# Patient Record
Sex: FEMALE | Race: WHITE | NOT HISPANIC OR LATINO | Employment: FULL TIME | URBAN - METROPOLITAN AREA
[De-identification: names, ages, dates, MRNs, and addresses within clinical notes are randomized per-mention and may not be internally consistent; named-entity substitution may affect disease eponyms.]

---

## 2023-08-07 ENCOUNTER — TELEPHONE (OUTPATIENT)
Dept: PRIMARY CARE | Facility: CLINIC | Age: 48
End: 2023-08-07
Payer: COMMERCIAL

## 2023-08-07 DIAGNOSIS — N92.1 MENORRHAGIA WITH IRREGULAR CYCLE: Primary | ICD-10-CM

## 2023-08-07 NOTE — TELEPHONE ENCOUNTER
Patient say she has been on her cycle for 2 plus weeks  she want to know what to do , she say this is not normal for her

## 2024-02-28 PROBLEM — L02.91 ABSCESS: Status: ACTIVE | Noted: 2024-02-28

## 2024-02-28 PROBLEM — R87.619 ABNORMAL PAP SMEAR OF CERVIX: Status: ACTIVE | Noted: 2024-02-28

## 2024-02-28 PROBLEM — N75.1 BARTHOLIN'S GLAND ABSCESS: Status: ACTIVE | Noted: 2024-02-28

## 2024-02-28 PROBLEM — E55.9 VITAMIN D DEFICIENCY: Status: ACTIVE | Noted: 2024-02-28

## 2024-02-28 PROBLEM — R00.2 FLUTTERING SENSATION OF HEART: Status: ACTIVE | Noted: 2024-02-28

## 2024-02-28 PROBLEM — R11.2 POST-OPERATIVE NAUSEA AND VOMITING: Status: ACTIVE | Noted: 2024-02-28

## 2024-02-28 PROBLEM — K91.2 POSTOPERATIVE MALABSORPTION (HHS-HCC): Status: ACTIVE | Noted: 2024-02-28

## 2024-02-28 PROBLEM — R05.9 COUGH: Status: ACTIVE | Noted: 2024-02-28

## 2024-02-28 PROBLEM — E66.9 CLASS 1 OBESITY: Status: ACTIVE | Noted: 2024-02-28

## 2024-02-28 PROBLEM — Z98.890 POST-OPERATIVE NAUSEA AND VOMITING: Status: ACTIVE | Noted: 2024-02-28

## 2024-02-28 PROBLEM — D48.5 NEOPLASM OF UNCERTAIN BEHAVIOR OF SKIN: Status: ACTIVE | Noted: 2023-07-18

## 2024-02-28 PROBLEM — R93.1 ELEVATED CORONARY ARTERY CALCIUM SCORE: Status: ACTIVE | Noted: 2024-02-28

## 2024-02-28 PROBLEM — L02.214 CUTANEOUS ABSCESS OF GROIN: Status: ACTIVE | Noted: 2023-07-18

## 2024-02-28 PROBLEM — R79.0 ABNORMAL IRON SATURATION: Status: ACTIVE | Noted: 2024-02-28

## 2024-02-28 PROBLEM — N60.02 BREAST CYST, LEFT: Status: ACTIVE | Noted: 2024-02-28

## 2024-02-28 PROBLEM — M25.50 JOINT PAIN: Status: ACTIVE | Noted: 2024-02-28

## 2024-02-28 PROBLEM — L73.2 HIDRADENITIS SUPPURATIVA: Status: ACTIVE | Noted: 2023-07-18

## 2024-02-28 PROBLEM — R60.9 EDEMA: Status: ACTIVE | Noted: 2024-02-28

## 2024-02-28 PROBLEM — D22.62 MELANOCYTIC NEVI OF LEFT UPPER LIMB, INCLUDING SHOULDER: Status: ACTIVE | Noted: 2023-07-18

## 2024-02-28 PROBLEM — R42 DIZZY: Status: ACTIVE | Noted: 2024-02-28

## 2024-02-28 PROBLEM — Z98.84 S/P GASTRIC BYPASS: Status: ACTIVE | Noted: 2024-02-28

## 2024-02-28 PROBLEM — R29.818 SUSPECTED SLEEP APNEA: Status: ACTIVE | Noted: 2024-02-28

## 2024-02-28 PROBLEM — R92.30 DENSE BREAST TISSUE: Status: ACTIVE | Noted: 2024-02-28

## 2024-02-28 PROBLEM — L81.4 OTHER MELANIN HYPERPIGMENTATION: Status: ACTIVE | Noted: 2023-07-18

## 2024-02-28 PROBLEM — R39.14 FEELING OF INCOMPLETE BLADDER EMPTYING: Status: ACTIVE | Noted: 2024-02-28

## 2024-02-28 PROBLEM — R53.83 LACK OF ENERGY: Status: ACTIVE | Noted: 2024-02-28

## 2024-02-28 PROBLEM — K21.9 GERD (GASTROESOPHAGEAL REFLUX DISEASE): Status: ACTIVE | Noted: 2024-02-28

## 2024-02-28 PROBLEM — R63.5 WEIGHT GAIN: Status: ACTIVE | Noted: 2024-02-28

## 2024-02-28 PROBLEM — Z98.84 BARIATRIC SURGERY STATUS: Status: ACTIVE | Noted: 2024-02-28

## 2024-02-28 PROBLEM — R68.89 FORGETFULNESS: Status: ACTIVE | Noted: 2024-02-28

## 2024-02-28 PROBLEM — N89.8 VAGINAL DISCHARGE: Status: ACTIVE | Noted: 2024-02-28

## 2024-02-28 PROBLEM — N63.10 BREAST MASS, RIGHT: Status: ACTIVE | Noted: 2024-02-28

## 2024-02-28 PROBLEM — D23.61 OTHER BENIGN NEOPLASM OF SKIN OF RIGHT UPPER LIMB, INCLUDING SHOULDER: Status: ACTIVE | Noted: 2023-07-18

## 2024-02-28 PROBLEM — F32.A DEPRESSION: Status: ACTIVE | Noted: 2024-02-28

## 2024-02-28 PROBLEM — C50.919 BREAST CANCER (MULTI): Status: ACTIVE | Noted: 2024-02-28

## 2024-02-28 PROBLEM — D22.70 MELANOCYTIC NEVI OF UNSPECIFIED LOWER LIMB, INCLUDING HIP: Status: ACTIVE | Noted: 2023-07-18

## 2024-02-28 PROBLEM — M67.02 ACQUIRED SHORT ACHILLES TENDON OF LEFT LOWER EXTREMITY: Status: ACTIVE | Noted: 2024-02-28

## 2024-02-28 PROBLEM — G89.18 POST-OP PAIN: Status: ACTIVE | Noted: 2024-02-28

## 2024-02-28 PROBLEM — I83.11 VARICOSE VEINS OF RIGHT LOWER EXTREMITY WITH INFLAMMATION: Status: ACTIVE | Noted: 2023-07-18

## 2024-02-28 PROBLEM — D18.01 HEMANGIOMA OF SKIN AND SUBCUTANEOUS TISSUE: Status: ACTIVE | Noted: 2023-07-18

## 2024-02-28 PROBLEM — E66.811 CLASS 1 OBESITY: Status: ACTIVE | Noted: 2024-02-28

## 2024-02-28 PROBLEM — J30.2 SEASONAL ALLERGIES: Status: ACTIVE | Noted: 2024-02-28

## 2024-02-28 PROBLEM — C50.311: Status: ACTIVE | Noted: 2024-02-28

## 2024-02-28 PROBLEM — M72.2 PLANTAR FASCIITIS, LEFT: Status: ACTIVE | Noted: 2024-02-28

## 2024-02-28 PROBLEM — E61.1 IRON DEFICIENCY: Status: ACTIVE | Noted: 2024-02-28

## 2024-02-28 PROBLEM — G47.33 OBSTRUCTIVE SLEEP APNEA SYNDROME: Status: ACTIVE | Noted: 2024-02-28

## 2024-02-28 PROBLEM — D49.2 NEOPLASM OF UNSPECIFIED BEHAVIOR OF BONE, SOFT TISSUE, AND SKIN: Status: ACTIVE | Noted: 2023-07-18

## 2024-02-28 RX ORDER — CITALOPRAM 20 MG/1
1 TABLET, FILM COATED ORAL DAILY
COMMUNITY
Start: 2020-06-12

## 2024-02-28 RX ORDER — CLINDAMYCIN PHOSPHATE 10 UG/ML
LOTION TOPICAL
COMMUNITY
Start: 2023-05-10

## 2024-02-28 RX ORDER — BENZOYL PEROXIDE 50 MG/ML
LIQUID TOPICAL
COMMUNITY
Start: 2023-07-19

## 2024-02-28 RX ORDER — PANTOPRAZOLE SODIUM 40 MG/1
1 TABLET, DELAYED RELEASE ORAL DAILY
COMMUNITY
Start: 2022-12-12 | End: 2024-04-24 | Stop reason: ALTCHOICE

## 2024-02-28 RX ORDER — GUAIFENESIN 600 MG/1
600 TABLET, EXTENDED RELEASE ORAL 2 TIMES DAILY
COMMUNITY
Start: 2018-11-24 | End: 2024-04-24 | Stop reason: ALTCHOICE

## 2024-02-28 RX ORDER — ATORVASTATIN CALCIUM 10 MG/1
1 TABLET, FILM COATED ORAL DAILY
COMMUNITY
Start: 2020-12-11 | End: 2024-04-24 | Stop reason: ALTCHOICE

## 2024-02-28 RX ORDER — ASTRAGALUS ROOT 470 MG
CAPSULE ORAL WEEKLY
COMMUNITY
Start: 2021-12-20 | End: 2024-04-24 | Stop reason: ALTCHOICE

## 2024-02-28 RX ORDER — ASPIRIN 325 MG
TABLET ORAL EVERY 12 HOURS
COMMUNITY
Start: 2021-12-20

## 2024-02-28 RX ORDER — BENZONATATE 100 MG/1
100 CAPSULE ORAL EVERY 8 HOURS PRN
COMMUNITY
Start: 2018-11-24 | End: 2024-04-24 | Stop reason: ALTCHOICE

## 2024-02-28 RX ORDER — SODIUM PICOSULFATE, MAGNESIUM OXIDE, AND ANHYDROUS CITRIC ACID 10; 3.5; 12 MG/160ML; G/160ML; G/160ML
LIQUID ORAL
COMMUNITY
Start: 2021-11-12 | End: 2024-04-24 | Stop reason: ALTCHOICE

## 2024-04-23 PROBLEM — Z85.3 HISTORY OF RIGHT BREAST CANCER: Status: ACTIVE | Noted: 2024-04-23

## 2024-04-23 PROBLEM — Z00.00 NORMAL BREAST EXAM: Status: ACTIVE | Noted: 2024-02-28

## 2024-04-23 NOTE — PROGRESS NOTES
"Oncology Follow-Up    Nydia Braun  [id number]              Breast         AJCC Edition: 7th (AJCC), Diagnosis Date: 12-Sep-2016, IIA, T2 N0 M0     Oncology History    No history exists.     Treatment History:    Pt reports feeling a \"bump\" in her right breast about 6 months prior to breast imaging.  Was considering weight loss surgery and given breast symptoms, imaging was  ordered     8/29/16- Diagnostic b/l mammogram with gertrudis and b/l breast ultrasound- On mammogram, right breast circumscribed mass in inferior medial breast and mass in superior right breast (may represent intramammary LN). Asymmetry in left breast.  U/s right breast-  4:00, 9cm FN, 3.6 X 3.3 X 3.5 cm irregular hypoechoic mass; in superior breast, at 9:00, 8 cm FN, 2 intramammary LNs, larger one measuring 0.6 cm.  U/s left breast 0.8 cm complicated cyst at 12:00, 7 cm FN.  Tissue markers placed at time of biopsy     9/12/16- U/s guided core biopsy of right breast mass, 4:00, 9cm FN- IDC, grade 3 with extensive necrosis, ER-neg, HI-neg, HER2 2+ by IHC (dual NACHO neg with a HER2/CEP17 ratio of 1.2).  Right breast intramammary LN biopsy- negative for cancer.     9/29/16 - Mediport placed     12/14/16 - negative genetic testing     10/5/16 - 2/15/17 neoadjuvant ddAC every 2 weeks X 4---> weekly taxol X 12 completed     2/27/17- Right lumpectomy with SN biopsy showed no residual cancer; 0/2 LNs involved, no LVI.  Path staging: pCR, ypT0N0, RCB 0     Post-lumpectomy right breast radiation completed 5/2017       Subjective    Nydia presents for her Oncology Follow Up Visit. She feels well and reports no new health issues. She has gained 20# back of her weight loss since bariatric surgery. Nydia rates her energy level as 9/10 and reports no distress. Her family is doing well with living with her parents though there are some hurdles. Nydia denies any unusual headaches, balance issues, depression, cough, shortness of breath, problems swallowing, " changes in chest/breast area, abdominal pain, bone or muscle pain, vaginal bleeding, rectal bleeding, blood in the urine, vaginal dryness, swelling arms or legs, new or unusual skin moles or lesions.         Objective      Vitals:    04/24/24 0833   BP: 110/72   Pulse: 54   Temp: 36.1 °C (97 °F)        Constitutional: Well developed, alert/oriented x3, no distress, cooperative   Eyes: clear sclera   ENMT: mucous membranes moist, no apparent lesions   Head/Neck: Neck supple, no bruits   Respiratory/Thorax: Patent airways, normal breath sounds with good chest expansion   Cardiovascular: Regular rate and rhythm, no murmurs, 2+ equal pulses of the extremities,   Gastrointestinal: Nondistended, soft, non-tender, no masses palpable, no organomegaly   Musculoskeletal: ROM intact, no joint swelling, normal strength   Extremities: normal extremities, no edema, cyanosis, contusions or wounds   Neurological: alert and oriented x3,  normal strength   Breast:   Lymphatic: No significant lymphadenopathy   Psychological: Appropriate mood and behavior   Skin: Warm and dry, no lesions, no rashes      Physical Exam  Chest:          Comments: Right breast + for breast conserving surgery with well healed lower inner and right axillary incisions; no masses, nodules, skin changes, discharge. Left breast without masses, nodules, skin changes, discharge.          Lab Results   Component Value Date    WBC 5.3 11/25/2022    HGB 10.6 (L) 11/25/2022    HCT 32.4 (L) 11/25/2022    MCV 96 11/25/2022     11/25/2022       Chemistry    Lab Results   Component Value Date/Time     11/25/2022 1445    K 4.2 11/25/2022 1445     11/25/2022 1445    CO2 29 11/25/2022 1445    BUN 10 11/25/2022 1445    CREATININE 0.58 11/25/2022 1445    Lab Results   Component Value Date/Time    CALCIUM 8.9 11/25/2022 1445    ALKPHOS 121 (H) 11/25/2022 1445    AST 19 11/25/2022 1445    ALT 19 11/25/2022 1445    BILITOT 0.3 11/25/2022 1445            Imaging:  Status Exam Begun Exam Ended   Final 4/24/2024 07:40 4/24/2024 08:02     Study Result    Narrative & Impression   Interpreted By:  Jaylene Sena,   STUDY:  BI MAMMO BILATERAL SCREENING TOMOSYNTHESIS;  4/24/2024 8:02 am      ACCESSION NUMBER(S):  [id number]      ORDERING CLINICIAN:  PURA PAULA      INDICATION:  Screening. Right lumpectomy with radiation treatment and chemotherapy.      COMPARISON:  04/07/2023, 02/11/2022, 02/09/2021, 01/29/2019, 08/29/2016      FINDINGS:  2D and tomosynthesis images were reviewed at 1 mm slice thickness.      Density:  The breast tissue is heterogeneously dense, which may  obscure small masses.      Stable postsurgical scarring with surgical clips and dystrophic  calcification in the deep central aspect of the right breast. Stable  postsurgical scarring overlying the right axilla. Stable mild  trabecular thickening and skin thickening of the right breast  consistent with radiation treatment. No suspicious masses or  calcifications are identified. The glandular pattern within the left  breast is stable.      IMPRESSION:  No mammographic evidence of malignancy.      BI-RADS CATEGORY:      BI-RADS Category:  2 Benign.  Recommendation:  Annual Screening.  Recommended Date:  1 Year.  Laterality:  Bilateral.     Assessment/Plan    Nydia is a 50 yo woman with a hx of T2N0 right IDC G2 diagnosed September 2016. She is s/p neoadjuvant AC-T with pCR, RBC=0, XRT. She has been on observation status; There is no evidence of recurrent disease on today's exam.   Plan:  Exam and mammogram negative.  Encouraged shingles vaccine and Covid booster.  Discussed importance of losing some of the weight she gained as it was 10# at our last visit and now it is 20#  Encouraged monthly breast self exams, plant based diet, keep alcohol <3 drinks/week, exercise at least 2.5 hours/week.  We reviewed signs/symptoms of recurrence including new masses, new pigmented lesion, tugging or pulling of the  skin, nipple discharge, rash in or around the chest area, or any new finding that doesn't resolve within a 2-3 weeks.  All of Nydia's questions/concerns were addressed.   Over 25 minutes of time was spent with this patient with >50% of the time with education, counseling, and coordination of care.  I will see her back with her mammogram in one year. She will call with any concerns.    Diagnoses and all orders for this visit:  History of right breast cancer  -     Clinic Appointment Request Follow Up; PURA PAULA; Future  -     BI mammo bilateral screening tomosynthesis; Future  Normal breast exam  History of bariatric surgery  History of antineoplastic chemotherapy        Pura Paual, APRN-CNP

## 2024-04-24 ENCOUNTER — HOSPITAL ENCOUNTER (OUTPATIENT)
Dept: RADIOLOGY | Facility: CLINIC | Age: 49
Discharge: HOME | End: 2024-04-24
Payer: COMMERCIAL

## 2024-04-24 ENCOUNTER — APPOINTMENT (OUTPATIENT)
Dept: RADIOLOGY | Facility: CLINIC | Age: 49
End: 2024-04-24

## 2024-04-24 ENCOUNTER — OFFICE VISIT (OUTPATIENT)
Dept: HEMATOLOGY/ONCOLOGY | Facility: CLINIC | Age: 49
End: 2024-04-24
Payer: COMMERCIAL

## 2024-04-24 ENCOUNTER — APPOINTMENT (OUTPATIENT)
Dept: HEMATOLOGY/ONCOLOGY | Facility: CLINIC | Age: 49
End: 2024-04-24

## 2024-04-24 VITALS — BODY MASS INDEX: 29.99 KG/M2 | HEIGHT: 65 IN | WEIGHT: 180 LBS

## 2024-04-24 VITALS
BODY MASS INDEX: 30.74 KG/M2 | WEIGHT: 184.75 LBS | TEMPERATURE: 97 F | SYSTOLIC BLOOD PRESSURE: 110 MMHG | HEART RATE: 54 BPM | DIASTOLIC BLOOD PRESSURE: 72 MMHG

## 2024-04-24 DIAGNOSIS — Z85.3 HISTORY OF RIGHT BREAST CANCER: Primary | ICD-10-CM

## 2024-04-24 DIAGNOSIS — Z98.84 HISTORY OF BARIATRIC SURGERY: ICD-10-CM

## 2024-04-24 DIAGNOSIS — Z00.00 NORMAL BREAST EXAM: ICD-10-CM

## 2024-04-24 DIAGNOSIS — Z12.31 ENCOUNTER FOR SCREENING MAMMOGRAM FOR MALIGNANT NEOPLASM OF BREAST: ICD-10-CM

## 2024-04-24 DIAGNOSIS — Z92.21 HISTORY OF ANTINEOPLASTIC CHEMOTHERAPY: ICD-10-CM

## 2024-04-24 PROCEDURE — 99213 OFFICE O/P EST LOW 20 MIN: CPT | Performed by: NURSE PRACTITIONER

## 2024-04-24 PROCEDURE — 77063 BREAST TOMOSYNTHESIS BI: CPT | Mod: BILATERAL PROCEDURE | Performed by: RADIOLOGY

## 2024-04-24 PROCEDURE — 77067 SCR MAMMO BI INCL CAD: CPT

## 2024-04-24 PROCEDURE — 77067 SCR MAMMO BI INCL CAD: CPT | Mod: BILATERAL PROCEDURE | Performed by: RADIOLOGY

## 2024-04-24 ASSESSMENT — PAIN SCALES - GENERAL: PAINLEVEL: 0-NO PAIN

## 2024-04-24 NOTE — PATIENT INSTRUCTIONS
1. Exercise 2.5 hours per week; bone strengthening, cardio-vascular, resistance training.  2. Please do self breast exams monthly.  3. Keep alcohol under 3 drinks per week.  4. Sun safety - limit sun exposure from 11a-2p when its at its hottest, apply 15-30 sun block and re-apply every 1-2 hours if perspiring or swimming.  5. Eat a plant based diet, add in oily fishes such as mackerel, tuna, and salmon.  6. Get in at least 1,000 mg of calcium per day through diet or supplement for bone strength. Examples of foods higher in calcium are milk, yogurt, fruited yogurt, oranges, fortified orange juice, almonds, almond milk, broccoli, spinach, bok selvin, mustard greens, puddings, custards, ice cream, fortified cereals, bars, and crackers.   7. Your exam and mammogram are both negative today.  8. Please call the office if any new mass or rash in or around breast, or any uncontrolled symptoms that last over 2-3 weeks at [phone].  9. It was nice seeing you today, Roxy. I will see you back in one year. Please call with any concerns.   Have a nice spring and summer!  Have a Happy, Healthy, Holiday Season!

## 2024-05-28 NOTE — PROGRESS NOTES
Surgery Date: 5.26.21  Surgeon: Edilia  Procedure: Gastric Bypass    ASSESSMENT   Current weight pounds:     --             Ht:   65.0  in.   BMI:  --  Previous weight pounds:   172.0    6/15/23  Initial start weight: 257 lbs (5.20.21)  EBW: 107 lbs  Total weight change pounds :  --  %EBW Lost: --    PROGRESS:  Nutrition Interventions for last encounter (date):   1. Eat at least 80 g protein per day. Eat 80-90 g of protein per day. Aim for 20 g protein per meal. have 2 high protein snacks that are 10-15 g protein each. You can try cottage cheese, Greek yogurt, tuna or chicken packets. you can try protein bars like Pure Protein, Quest, Premier, and Built Bars. You can also try Quest protein chips.   2. Continue to drink 64 oz. of zero calorie beverages per day  3. Continue no drinking 30 min before, during the meal and for 30 minutes after the meal  4. Do exercise of choice for 30 min 3x/week. Increase intensity and duration of exercise  5. Take a total of 5452-5060 mg calcium citrate daily in divided doses of 500-600 mg, 2 hours apart from each other and from your Flintstones.   6. Remove all alcohol from your home. Make an apt with a therapist. Try to schedule with Dr. Kamini Sultana    CHANGES IN TREATMENT:   Patient met goals:     Partially   24 hour food recall:   11 am Breakfast:   Drinkable  Greek yogurt  20 g pro and banana  Snack:   none  Lunch:  none  Snack:   none  4 p Dinner:  tried to but had stomachache   Snack:  bone broth and toast  Snack: handful popcorn  Beverages:    64 oz water or  flavored water  Alcohol: 1x/week 2 glasses of wine;   was drinking  more often and has been working to reduce it over the past year     Vitamins:     2 Flintstones, Ocuvite, Mg PRN, and no calcium  Nausea/Vomiting/Diarrhea/Constipation:  +stomach pain and will start taking her PPI again   Physical Activity: no planned exercise     READINESS TO LEARN:  Motivation to learn:           Interested      Understanding of  instruction: Good       Anticipated Compliance: Good       Family Support: Unable to assess-family not present     Patient presents with post-op weight loss surgery gastric bypass. Pt reports her lowest weight was 160  pounds and her weight today is 184 pounds.  She  state that she has never done exercise well and would like to focus on getting more exercise in this summer and or increasing physical activity.  She signed for Pilates today to try it out.  She is going to do it 2x/week.  She is planning to ride her bike when staying her acamper.  She has a puppy and would like to walk it more.  She has a pool at home. She also has [employer] membership. She is off work for the summer.  She would like to set a goal to exercise on 3 days out of the week.    She sold her home and is now living with her parents to help take care of them.  She is their caregiver.    Pt reports some stomach sensitivity lately. She did not eat enough protein yesterday.  She is going to start taking omeprazole again.    She doesn't like chicken anymore.  It causes pain. She eats more servings of low fat dairy; cottage cheese, Greek yogurt.  She eats more snacks; Tyra kisses and popcorn   She states that she can eat candy without having dumping.    Advised to work on increasing protein intake to meet a goal of 80 g daily.  Discussed ways to do that.   Also advised to start taking calcium citrate again.  Recommend staying away from candy and popcorn.  Pt reports an issue with alcohol and has been doing well over the past year minimizing it to 2 glasses of wine per week.       Malnutrition Screening  Significant unintentional weight loss? n/a  Eating less than 75% of usual intake for more than 2 weeks? n/a     Nutrition Diagnosis:   1. Increased protein and nutrition needs related to altered GI function as evidenced by pt. s/p gastric bypass.  2. Food- and nutrition-related knowledge deficit related to lack of prior exposure to surgical weight loss  information as evidenced by diet recall.     Nutrition Interventions:   1. Modify type and amount of food and nutrients within meals and snacks.  2. Comprehensive Nutrition Education  -Nutrition education materials: SG schedule        Recommendations:    1. Eat 80 g of protein per day. . Aim for 2 oz or 14 g protein per meal and have 2 high protein snacks that are  10-20 g protein each.  You can try a tuna or chicken packet, Greek yogurt, 2 string cheeses, Protein bars like Quest, Pure Protein, Premier, or Built Bars. you can also try protein chips form Quest or Atkins.  Start tracking  your intake again to make sure you are meeting your protein goal.   2. Continue to drink 64 oz. of zero calorie beverages per day  3. Continue no drinking 30 min before, during the meal and for 30 minutes after the meal  4. Start doing exercise or  physical activity of choice for at  least 30 min 3x/week.   5. Continue current vit/min regimen. Add you calcium  citrate back in.  Take 1 chew  3x/day.   6.         Attend monthly support groups    Nutrition Monitoring and Evaluation:   1-2 pounds weight loss per week  Criteria: weight check, food recall  Need for Follow-up:   1 month       Diana RODRIGUES, [facility]  Bariatric Surgery Dietitian  Phone: [phone]  Fax: [phone]

## 2024-06-03 ENCOUNTER — APPOINTMENT (OUTPATIENT)
Dept: SURGERY | Facility: CLINIC | Age: 49
End: 2024-06-03
Payer: COMMERCIAL

## 2024-06-03 ENCOUNTER — TELEMEDICINE CLINICAL SUPPORT (OUTPATIENT)
Dept: SURGERY | Facility: CLINIC | Age: 49
End: 2024-06-03
Payer: COMMERCIAL

## 2024-06-12 ENCOUNTER — TELEPHONE (OUTPATIENT)
Dept: GYNECOLOGIC ONCOLOGY | Facility: HOSPITAL | Age: 49
End: 2024-06-12
Payer: COMMERCIAL

## 2024-06-12 DIAGNOSIS — Z85.3 PERSONAL HISTORY OF BREAST CANCER: ICD-10-CM

## 2024-06-12 DIAGNOSIS — F32.89 OTHER DEPRESSION: Primary | ICD-10-CM

## 2024-06-12 RX ORDER — CITALOPRAM 20 MG/1
20 TABLET, FILM COATED ORAL DAILY
Qty: 90 TABLET | Refills: 2 | Status: SHIPPED | OUTPATIENT
Start: 2024-06-12

## 2024-07-02 ENCOUNTER — NUTRITION (OUTPATIENT)
Dept: SURGERY | Facility: CLINIC | Age: 49
End: 2024-07-02
Payer: COMMERCIAL

## 2024-07-02 VITALS — BODY MASS INDEX: 30.29 KG/M2 | WEIGHT: 181.8 LBS | HEIGHT: 65 IN

## 2024-07-02 NOTE — PROGRESS NOTES
Surgery Date: 5.26.21  Surgeon: Edilia  Procedure: Gastric Bypass    ASSESSMENT:  Current weight pounds:    181.8             Ht:   65.0  in.   BMI:  30.25  Previous weight pounds:   --  Initial start weight: 257 lbs (5.20.21)  EBW: 107 lbs  Total weight change pounds :   75.2  %EBW Lost: 70.3%    PROGRESS:  Nutrition Interventions for last encounter (date):   1.          Eat 80 g of protein per day. . Aim for 2 oz or 14 g protein per meal and have 2 high protein snacks that are  10-20 g protein each.  You can try a tuna or chicken packet, Greek yogurt, 2 string cheeses, Protein bars like Quest, Pure Protein, Premier, or Built Bars. you can also try protein chips form Quest or Atkins.  Start tracking  your intake again to make sure you are meeting your protein goal.   2.          Continue to drink 64 oz. of zero calorie beverages per day  3.          Continue no drinking 30 min before, during the meal and for 30 minutes after the meal  4.          Start doing exercise or  physical activity of choice for at  least 30 min 3x/week.   5.          Continue current vit/min regimen. Add you calcium  citrate back in.  Take 1 chew  3x/day.   6.         Attend monthly support groups    CHANGES IN TREATMENT:   Patient met goals:    Partially   24 hour food recall:   Breakfast:  protein shake 30 g and coffee  Snack: none  Lunch:   1/2 chicken Caesar salad (3 oz chicken)     14-20 g  Snack:   chomp beef stick   10 g   Dinner:    1 c ground beef, rice  and veggies  (1-2 oz meat)  Snack:     movie popcorn 3 c  Beverages:   64 oz water or CL , 1 c coffee/day  Alcohol:  1x/week will have 2-3 glasses of wine      Vitamins:     2 Flintstones, Ocuvite, Mg PRN, and no calcium  Nausea/Vomiting/Diarrhea/Constipation:  +stomach pain and will start taking her PPI again   Physical Activity:  Pilates 2x/week, walking 2x/week, some water exercise 1x/week,     READINESS TO LEARN:  Motivation to learn:           Interested      Understanding of  instruction: Good       Anticipated Compliance: Good      Family Support: Unable to assess-family not present     Patient presents with post-op weight loss surgery gastric bypass.  Pt states she is doing better.  She has increased her activity.  She is meeting her goal for fluid and protein. Recommend tracking intake to be sure she is meeting her protein goal.  Reminded to take calcium citrate daily.  Encouraged more exercise/activity.  Reminded to make an apt with Dr. Sultana.     Malnutrition Screening  Significant unintentional weight loss? n/a  Eating less than 75% of usual intake for more than 2 weeks? n/a     Nutrition Diagnosis:   1. Increased protein and nutrition needs related to altered GI function as evidenced by pt. s/p gastric bypass.  2. Food- and nutrition-related knowledge deficit related to lack of prior exposure to surgical weight loss information as evidenced by diet recall.     Nutrition Interventions:   1. Modify type and amount of food and nutrients within meals and snacks.  2. Comprehensive Nutrition Education  -Nutrition education materials: SG schedule        Recommendations:    1. Eat at least 80 g of protein per day  2. Continue  to drink 64 oz. of zero calorie beverages per day  3. Continue no drinking 30 min before, during the meal and for 30 minutes after the meal  4. Increase intensity and duration of exercise  5. Continue current vit/min regimen. Take a total of 7620-3724 mg calcium citrate daily in divided doses of 500-600 mg, 2 hours apart from each other and from your Flintstones.   6.             Attend monthly support groups  7.          Make an apt with a therapist. Try to schedule with Dr. Kamini Sultana     Nutrition Monitoring and Evaluation:   1-2 pounds weight loss per week  Criteria: weight check, food recall  Need for Follow-up:   3 months      Diana RODRIGUES, [facility]  Bariatric Surgery Dietitian  Phone: [phone]  Fax: [phone]

## 2024-09-17 ENCOUNTER — OFFICE VISIT (OUTPATIENT)
Dept: URGENT CARE | Age: 49
End: 2024-09-17
Payer: COMMERCIAL

## 2024-09-17 VITALS
TEMPERATURE: 97.4 F | HEART RATE: 76 BPM | RESPIRATION RATE: 18 BRPM | HEIGHT: 65 IN | BODY MASS INDEX: 31.66 KG/M2 | DIASTOLIC BLOOD PRESSURE: 76 MMHG | WEIGHT: 190.04 LBS | OXYGEN SATURATION: 97 % | SYSTOLIC BLOOD PRESSURE: 120 MMHG

## 2024-09-17 DIAGNOSIS — J04.0 ACUTE VIRAL LARYNGITIS: Primary | ICD-10-CM

## 2024-09-17 NOTE — PROGRESS NOTES
Subjective   Patient ID: Nydia Braun is a 49 y.o. female. They present today with a chief complaint of Illness (Has been feeling sick for a few weeks).    History of Present Illness  Patient is a pleasant 49-year-old white female, past medical history of breast cancer in remission x 7 years, presented to the clinic with chief complaint of hoarse voice.  Patient is reporting approximately the 4-week history of intermittent upper respiratory symptoms since the school year started.  States right now she feels fine however is reporting a persistent hoarse voice.  States she drank lots of warm tea over the weekend which did provide some relief.  Has been using DayQuil NyQuil as needed for any upper respiratory congestion.  She denies any fever or chills.  No chest pain or shortness of breath.  No sore throat dysphagia odynophagia trismus drooling or change in voice.  No cough or sputum production.  No further complaints.        Illness      Past Medical History  Allergies as of 2024    (No Known Allergies)       (Not in a hospital admission)         Past Medical History:   Diagnosis Date    Breast cancer (Multi)     Gestational (pregnancy-induced) hypertension without significant proteinuria, unspecified trimester ([facility])     Pregnancy-induced hypertension, unspecified trimester    Hx antineoplastic chemo     Other conditions influencing health status     Onset of menses    Personal history of irradiation     Personal history of other complications of pregnancy, childbirth and the puerperium     History of miscarriage    Personal history of other diseases of the digestive system     History of hemorrhoids    Personal history of other infectious and parasitic diseases     History of HPV infection       Past Surgical History:   Procedure Laterality Date    BREAST LUMPECTOMY Right 2016    Lumpectomy     SECTION, CLASSIC  2016     Section    OTHER SURGICAL HISTORY  2016    Surgical  "Treatment Of Missed     OTHER SURGICAL HISTORY  2016    Tubal Ligation During  Section    TUBAL LIGATION  2016    Tubal Ligation            Review of Systems  Review of Systems     All review of systems negative unless stated in HPI.                           Objective    Vitals:    24 1458   BP: 120/76   BP Location: Right arm   Patient Position: Sitting   BP Cuff Size: Adult   Pulse: 76   Resp: 18   Temp: 36.3 °C (97.4 °F)   TempSrc: Temporal   SpO2: 97%   Weight: 86.2 kg (190 lb 0.6 oz)   Height: 1.651 m (5' 5\")     Patient's last menstrual period was 2023.    Physical Exam  General: Vitals Noted. No distress. Normocephalic.     HEENT: TMs normal, EOMI, normal conjunctiva, patent nares, Normal OP    Neck: Supple with no adenopathy.     Cardiac: Regular Rate and Rhythm. No murmur.     Pulmonary: Equal breath sounds bilaterally. No wheezes, rhonchi, or rales.    Abdomen: Soft, non-tender, with normal bowel sounds.     Musculoskeletal: Moves all extremities, no effusion, no edema.     Skin: No obvious rashes.    Procedures    Point of Care Test & Imaging Results from this visit    No results found.    Diagnostic study results (if any) were reviewed by David Mcleod PA-C.    Assessment/Plan   Allergies, medications, history, and pertinent labs/EKGs/Imaging reviewed by David Mcleod PA-C.     Medical Decision Making  Patient was seen eval in the clinic for complaint of hoarse voice.  On exam patient is nontoxic well-appearing resting bed comfortably no acute distress.  Vital signs are stable, afebrile.  Chest is clear, heart is regular, belly soft and nontender patient was have a hoarse voice on exam however no signs of peritonsillar abscess including muffled voice trismus or drooling.  This is likely an acute viral laryngitis and I advised the patient to rest her voice is much as possible and drink warm fluids.  Advised to follow-up with her primary care physician " in the next week.  I reviewed my impression, plan, and strict return precautions with the patient.  She expresses understanding and agreement plan of care.    Orders and Diagnoses  Diagnoses and all orders for this visit:  Acute viral laryngitis        Medical Admin Record      Follow Up Instructions  No follow-ups on file.    Patient disposition: Home    Electronically signed by David Mcleod PA-C  3:13 PM

## 2024-10-08 ENCOUNTER — APPOINTMENT (OUTPATIENT)
Dept: SURGERY | Facility: CLINIC | Age: 49
End: 2024-10-08
Payer: COMMERCIAL

## 2025-01-20 ENCOUNTER — OFFICE VISIT (OUTPATIENT)
Dept: PRIMARY CARE | Facility: CLINIC | Age: 50
End: 2025-01-20
Payer: COMMERCIAL

## 2025-01-20 VITALS
WEIGHT: 188 LBS | DIASTOLIC BLOOD PRESSURE: 80 MMHG | SYSTOLIC BLOOD PRESSURE: 126 MMHG | TEMPERATURE: 98.3 F | HEIGHT: 65 IN | OXYGEN SATURATION: 97 % | HEART RATE: 59 BPM | RESPIRATION RATE: 18 BRPM | BODY MASS INDEX: 31.32 KG/M2

## 2025-01-20 DIAGNOSIS — Z23 ENCOUNTER FOR IMMUNIZATION: ICD-10-CM

## 2025-01-20 DIAGNOSIS — Z13.0 SCREENING FOR DEFICIENCY ANEMIA: ICD-10-CM

## 2025-01-20 DIAGNOSIS — Z00.00 ANNUAL PHYSICAL EXAM: Primary | ICD-10-CM

## 2025-01-20 DIAGNOSIS — Z13.1 SCREENING FOR DIABETES MELLITUS: ICD-10-CM

## 2025-01-20 DIAGNOSIS — Z85.3 PERSONAL HISTORY OF BREAST CANCER: ICD-10-CM

## 2025-01-20 DIAGNOSIS — F32.89 OTHER DEPRESSION: ICD-10-CM

## 2025-01-20 DIAGNOSIS — E55.9 VITAMIN D DEFICIENCY: ICD-10-CM

## 2025-01-20 PROBLEM — D49.2 NEOPLASM OF UNSPECIFIED BEHAVIOR OF BONE, SOFT TISSUE, AND SKIN: Status: RESOLVED | Noted: 2023-07-18 | Resolved: 2025-01-20

## 2025-01-20 PROBLEM — N75.1 BARTHOLIN'S GLAND ABSCESS: Status: RESOLVED | Noted: 2024-02-28 | Resolved: 2025-01-20

## 2025-01-20 PROBLEM — G47.33 OBSTRUCTIVE SLEEP APNEA SYNDROME: Status: RESOLVED | Noted: 2024-02-28 | Resolved: 2025-01-20

## 2025-01-20 PROBLEM — D48.5 NEOPLASM OF UNCERTAIN BEHAVIOR OF SKIN: Status: RESOLVED | Noted: 2023-07-18 | Resolved: 2025-01-20

## 2025-01-20 PROBLEM — R63.5 WEIGHT GAIN: Status: RESOLVED | Noted: 2024-02-28 | Resolved: 2025-01-20

## 2025-01-20 PROBLEM — G89.18 POST-OP PAIN: Status: RESOLVED | Noted: 2024-02-28 | Resolved: 2025-01-20

## 2025-01-20 PROBLEM — C50.311: Status: RESOLVED | Noted: 2024-02-28 | Resolved: 2025-01-20

## 2025-01-20 PROBLEM — N89.8 VAGINAL DISCHARGE: Status: RESOLVED | Noted: 2024-02-28 | Resolved: 2025-01-20

## 2025-01-20 PROBLEM — R29.818 SUSPECTED SLEEP APNEA: Status: RESOLVED | Noted: 2024-02-28 | Resolved: 2025-01-20

## 2025-01-20 PROBLEM — Z98.890 POST-OPERATIVE NAUSEA AND VOMITING: Status: RESOLVED | Noted: 2024-02-28 | Resolved: 2025-01-20

## 2025-01-20 PROBLEM — R60.9 EDEMA: Status: RESOLVED | Noted: 2024-02-28 | Resolved: 2025-01-20

## 2025-01-20 PROBLEM — R42 DIZZY: Status: RESOLVED | Noted: 2024-02-28 | Resolved: 2025-01-20

## 2025-01-20 PROBLEM — K91.2 POSTOPERATIVE MALABSORPTION (HHS-HCC): Status: RESOLVED | Noted: 2024-02-28 | Resolved: 2025-01-20

## 2025-01-20 PROBLEM — R53.83 LACK OF ENERGY: Status: RESOLVED | Noted: 2024-02-28 | Resolved: 2025-01-20

## 2025-01-20 PROBLEM — R11.2 POST-OPERATIVE NAUSEA AND VOMITING: Status: RESOLVED | Noted: 2024-02-28 | Resolved: 2025-01-20

## 2025-01-20 PROBLEM — L02.214 CUTANEOUS ABSCESS OF GROIN: Status: RESOLVED | Noted: 2023-07-18 | Resolved: 2025-01-20

## 2025-01-20 PROBLEM — C50.919 BREAST CANCER (MULTI): Status: RESOLVED | Noted: 2024-02-28 | Resolved: 2025-01-20

## 2025-01-20 PROBLEM — Z98.84 BARIATRIC SURGERY STATUS: Status: RESOLVED | Noted: 2024-02-28 | Resolved: 2025-01-20

## 2025-01-20 PROBLEM — L02.91 ABSCESS: Status: RESOLVED | Noted: 2024-02-28 | Resolved: 2025-01-20

## 2025-01-20 PROCEDURE — 3008F BODY MASS INDEX DOCD: CPT | Performed by: NURSE PRACTITIONER

## 2025-01-20 PROCEDURE — 99396 PREV VISIT EST AGE 40-64: CPT | Performed by: NURSE PRACTITIONER

## 2025-01-20 PROCEDURE — 1036F TOBACCO NON-USER: CPT | Performed by: NURSE PRACTITIONER

## 2025-01-20 PROCEDURE — 90656 IIV3 VACC NO PRSV 0.5 ML IM: CPT | Performed by: NURSE PRACTITIONER

## 2025-01-20 PROCEDURE — 90471 IMMUNIZATION ADMIN: CPT | Performed by: NURSE PRACTITIONER

## 2025-01-20 RX ORDER — CITALOPRAM 20 MG/1
20 TABLET, FILM COATED ORAL DAILY
Qty: 90 TABLET | Refills: 1 | Status: SHIPPED | OUTPATIENT
Start: 2025-01-20

## 2025-01-20 ASSESSMENT — ENCOUNTER SYMPTOMS
CONSTITUTIONAL NEGATIVE: 1
ENDOCRINE NEGATIVE: 1
RESPIRATORY NEGATIVE: 1
MUSCULOSKELETAL NEGATIVE: 1
HEMATOLOGIC/LYMPHATIC NEGATIVE: 1
ALLERGIC/IMMUNOLOGIC NEGATIVE: 1
GASTROINTESTINAL NEGATIVE: 1
CARDIOVASCULAR NEGATIVE: 1
PSYCHIATRIC NEGATIVE: 1
EYES NEGATIVE: 1
NEUROLOGICAL NEGATIVE: 1

## 2025-01-20 ASSESSMENT — LIFESTYLE VARIABLES
SKIP TO QUESTIONS 9-10: 0
AUDIT-C TOTAL SCORE: 4
HAVE YOU OR SOMEONE ELSE BEEN INJURED AS A RESULT OF YOUR DRINKING: NO
HOW OFTEN DURING THE LAST YEAR HAVE YOU BEEN UNABLE TO REMEMBER WHAT HAPPENED THE NIGHT BEFORE BECAUSE YOU HAD BEEN DRINKING: NEVER
HOW OFTEN DURING THE LAST YEAR HAVE YOU FOUND THAT YOU WERE NOT ABLE TO STOP DRINKING ONCE YOU HAD STARTED: NEVER
HOW OFTEN DO YOU HAVE SIX OR MORE DRINKS ON ONE OCCASION: NEVER
AUDIT TOTAL SCORE: 4
HOW OFTEN DURING THE LAST YEAR HAVE YOU NEEDED AN ALCOHOLIC DRINK FIRST THING IN THE MORNING TO GET YOURSELF GOING AFTER A NIGHT OF HEAVY DRINKING: NEVER
HOW OFTEN DURING THE LAST YEAR HAVE YOU FAILED TO DO WHAT WAS NORMALLY EXPECTED FROM YOU BECAUSE OF DRINKING: NEVER
HOW OFTEN DO YOU HAVE A DRINK CONTAINING ALCOHOL: 2-3 TIMES A WEEK
HAS A RELATIVE, FRIEND, DOCTOR, OR ANOTHER HEALTH PROFESSIONAL EXPRESSED CONCERN ABOUT YOUR DRINKING OR SUGGESTED YOU CUT DOWN: NO
HOW MANY STANDARD DRINKS CONTAINING ALCOHOL DO YOU HAVE ON A TYPICAL DAY: 3 OR 4
HOW OFTEN DURING THE LAST YEAR HAVE YOU HAD A FEELING OF GUILT OR REMORSE AFTER DRINKING: NEVER

## 2025-01-20 ASSESSMENT — PATIENT HEALTH QUESTIONNAIRE - PHQ9
1. LITTLE INTEREST OR PLEASURE IN DOING THINGS: NOT AT ALL
2. FEELING DOWN, DEPRESSED OR HOPELESS: NOT AT ALL
SUM OF ALL RESPONSES TO PHQ9 QUESTIONS 1 AND 2: 0

## 2025-01-20 ASSESSMENT — PAIN SCALES - GENERAL: PAINLEVEL_OUTOF10: 0-NO PAIN

## 2025-01-20 NOTE — PROGRESS NOTES
"Chief Complaint  Nydia Braun is a 49 y.o. female presenting for \"Annual Exam (New pt physical/Breast cancer survivor - next mammo schedule for april).\"    HPI     Nydia Braun is a 49 y.o. female presenting new to the office here today for a physical she had breast cancer in 2016, has a mammogram scheduled for April due for lab work, has some mild depression.        Past Medical History  Patient Active Problem List    Diagnosis Date Noted    History of antineoplastic chemotherapy 04/24/2024    History of right breast cancer 04/23/2024    Abnormal iron saturation 02/28/2024    Abnormal Pap smear of cervix 02/28/2024    Acquired short Achilles tendon of left lower extremity 02/28/2024    Breast cyst, left 02/28/2024    Breast mass, right 02/28/2024    Class 1 obesity 02/28/2024    Cough 02/28/2024    Depression 02/28/2024    Elevated coronary artery calcium score 02/28/2024    Feeling of incomplete bladder emptying 02/28/2024    Fluttering sensation of heart 02/28/2024    Forgetfulness 02/28/2024    GERD (gastroesophageal reflux disease) 02/28/2024    Iron deficiency 02/28/2024    Joint pain 02/28/2024    Plantar fasciitis, left 02/28/2024    History of bariatric surgery 02/28/2024    Seasonal allergies 02/28/2024    Vitamin D deficiency 02/28/2024    Other melanin hyperpigmentation 07/18/2023    Hemangioma of skin and subcutaneous tissue 07/18/2023    Hidradenitis suppurativa 07/18/2023    Melanocytic nevi of left upper limb, including shoulder 07/18/2023    Melanocytic nevi of unspecified lower limb, including hip 07/18/2023    Other benign neoplasm of skin of right upper limb, including shoulder 07/18/2023    Varicose veins of right lower extremity with inflammation 07/18/2023        Medications  Current Outpatient Medications   Medication Instructions    citalopram (CELEXA) 20 mg, oral, Daily    pediatric multivitamin (Children's Multivitamin) tablet,chewable chewable tablet Every 12 hours    "     Surgical History  She has a past surgical history that includes  section, classic (2016); Other surgical history (2016); Tubal ligation (2016); Other surgical history (2016); and Breast lumpectomy (Right, 2016).     Social History  She reports that she has never smoked. She has never been exposed to tobacco smoke. She has never used smokeless tobacco. She reports current alcohol use. She reports that she does not use drugs.    Family History  Family History   Problem Relation Name Age of Onset    Breast cancer Mother's Sister  40 - 49        Again in her 60s        Allergies  Patient has no known allergies.    ROS  Review of Systems   Constitutional: Negative.    HENT: Negative.     Eyes: Negative.    Respiratory: Negative.     Cardiovascular: Negative.    Gastrointestinal: Negative.    Endocrine: Negative.    Genitourinary: Negative.    Musculoskeletal: Negative.    Skin: Negative.    Allergic/Immunologic: Negative.    Neurological: Negative.    Hematological: Negative.    Psychiatric/Behavioral: Negative.          Last Recorded Vitals  /80 (BP Location: Right arm, Patient Position: Sitting, BP Cuff Size: Adult)   Pulse 59   Temp 36.8 °C (98.3 °F)   Resp 18   Wt 85.3 kg (188 lb)   SpO2 97%     Physical Exam  Vitals and nursing note reviewed.   Constitutional:       Appearance: Normal appearance.   HENT:      Head: Normocephalic and atraumatic.      Right Ear: Tympanic membrane, ear canal and external ear normal.      Left Ear: Tympanic membrane, ear canal and external ear normal.      Nose: Nose normal.      Mouth/Throat:      Mouth: Mucous membranes are moist.      Pharynx: Oropharynx is clear.   Eyes:      Extraocular Movements: Extraocular movements intact.      Conjunctiva/sclera: Conjunctivae normal.      Pupils: Pupils are equal, round, and reactive to light.   Neck:      Thyroid: No thyromegaly.   Cardiovascular:      Rate and Rhythm: Normal rate and regular  rhythm.      Pulses: Normal pulses.      Heart sounds: Normal heart sounds, S1 normal and S2 normal.   Pulmonary:      Effort: Pulmonary effort is normal.      Breath sounds: Normal breath sounds. No wheezing or rhonchi.   Abdominal:      General: Bowel sounds are normal.      Palpations: Abdomen is soft. There is no mass.      Tenderness: There is no abdominal tenderness. There is no guarding.   Genitourinary:     Comments: Not examined  Musculoskeletal:         General: Normal range of motion.      Cervical back: Normal range of motion.      Right lower leg: No edema.      Left lower leg: No edema.   Lymphadenopathy:      Cervical: No cervical adenopathy.   Skin:     General: Skin is warm and dry.      Capillary Refill: Capillary refill takes less than 2 seconds.      Findings: No rash.   Neurological:      General: No focal deficit present.      Mental Status: She is alert and oriented to person, place, and time. Mental status is at baseline.      Cranial Nerves: Cranial nerves 2-12 are intact. No cranial nerve deficit.      Sensory: Sensation is intact.      Motor: Motor function is intact.      Coordination: Coordination is intact.      Gait: Gait is intact.   Psychiatric:         Mood and Affect: Mood normal.         Behavior: Behavior normal.         Thought Content: Thought content normal.         Judgment: Judgment normal.         Relevant Results      Assessment/Plan   Nydia was seen today for annual exam.  Diagnoses and all orders for this visit:  Annual physical exam (Primary)  -     Comprehensive Metabolic Panel; Future  -     Lipid Panel; Future  -     CBC; Future  Other depression  -     citalopram (CeleXA) 20 mg tablet; Take 1 tablet (20 mg) by mouth once daily.  Personal history of breast cancer  -     citalopram (CeleXA) 20 mg tablet; Take 1 tablet (20 mg) by mouth once daily.  Screening for diabetes mellitus  -     Hemoglobin A1C; Future  Screening for deficiency anemia  Vitamin D deficiency  -      Vitamin D 25-Hydroxy,Total (for eval of Vitamin D levels); Future  Encounter for immunization  -     Flu vaccine, trivalent, preservative free, age 6 months and greater (Fluraix/Fluzone/Flulaval)          COUNSELING      Medication education:              Education:  The patient is counseled regarding potential side-effects of any and all new medications             Understanding:  Patient expressed understanding             Adherence:  No barriers to adherence identified        Luh Rush, APRN-CNP

## 2025-03-15 LAB
25(OH)D3+25(OH)D2 SERPL-MCNC: 33 NG/ML (ref 30–100)
ALBUMIN SERPL-MCNC: 4.5 G/DL (ref 3.6–5.1)
ALP SERPL-CCNC: 134 U/L (ref 37–153)
ALT SERPL-CCNC: 9 U/L (ref 6–29)
ANION GAP SERPL CALCULATED.4IONS-SCNC: 8 MMOL/L (CALC) (ref 7–17)
AST SERPL-CCNC: 12 U/L (ref 10–35)
BILIRUB SERPL-MCNC: 0.4 MG/DL (ref 0.2–1.2)
BUN SERPL-MCNC: 8 MG/DL (ref 7–25)
CALCIUM SERPL-MCNC: 9.3 MG/DL (ref 8.6–10.4)
CHLORIDE SERPL-SCNC: 105 MMOL/L (ref 98–110)
CHOLEST SERPL-MCNC: 124 MG/DL
CHOLEST/HDLC SERPL: 2.5 (CALC)
CO2 SERPL-SCNC: 28 MMOL/L (ref 20–32)
CREAT SERPL-MCNC: 0.54 MG/DL (ref 0.5–1.03)
EGFRCR SERPLBLD CKD-EPI 2021: 112 ML/MIN/1.73M2
ERYTHROCYTE [DISTWIDTH] IN BLOOD BY AUTOMATED COUNT: 12.6 % (ref 11–15)
EST. AVERAGE GLUCOSE BLD GHB EST-MCNC: 114 MG/DL
EST. AVERAGE GLUCOSE BLD GHB EST-SCNC: 6.3 MMOL/L
GLUCOSE SERPL-MCNC: 92 MG/DL (ref 65–99)
HBA1C MFR BLD: 5.6 % OF TOTAL HGB
HCT VFR BLD AUTO: 36.9 % (ref 35–45)
HDLC SERPL-MCNC: 49 MG/DL
HGB BLD-MCNC: 12.2 G/DL (ref 11.7–15.5)
LDLC SERPL CALC-MCNC: 61 MG/DL (CALC)
MCH RBC QN AUTO: 30.5 PG (ref 27–33)
MCHC RBC AUTO-ENTMCNC: 33.1 G/DL (ref 32–36)
MCV RBC AUTO: 92.3 FL (ref 80–100)
NONHDLC SERPL-MCNC: 75 MG/DL (CALC)
PLATELET # BLD AUTO: 212 THOUSAND/UL (ref 140–400)
PMV BLD REES-ECKER: 11.2 FL (ref 7.5–12.5)
POTASSIUM SERPL-SCNC: 4.8 MMOL/L (ref 3.5–5.3)
PROT SERPL-MCNC: 7.1 G/DL (ref 6.1–8.1)
RBC # BLD AUTO: 4 MILLION/UL (ref 3.8–5.1)
SODIUM SERPL-SCNC: 141 MMOL/L (ref 135–146)
TRIGL SERPL-MCNC: 64 MG/DL
WBC # BLD AUTO: 4.8 THOUSAND/UL (ref 3.8–10.8)

## 2025-04-25 ENCOUNTER — OFFICE VISIT (OUTPATIENT)
Dept: HEMATOLOGY/ONCOLOGY | Facility: CLINIC | Age: 50
End: 2025-04-25
Payer: COMMERCIAL

## 2025-04-25 ENCOUNTER — HOSPITAL ENCOUNTER (OUTPATIENT)
Dept: RADIOLOGY | Facility: CLINIC | Age: 50
Discharge: HOME | End: 2025-04-25
Payer: COMMERCIAL

## 2025-04-25 VITALS
SYSTOLIC BLOOD PRESSURE: 120 MMHG | OXYGEN SATURATION: 99 % | BODY MASS INDEX: 30.25 KG/M2 | HEART RATE: 55 BPM | RESPIRATION RATE: 16 BRPM | TEMPERATURE: 98 F | WEIGHT: 181.77 LBS | DIASTOLIC BLOOD PRESSURE: 77 MMHG

## 2025-04-25 VITALS — WEIGHT: 188.05 LBS | HEIGHT: 65 IN | BODY MASS INDEX: 31.33 KG/M2

## 2025-04-25 DIAGNOSIS — Z92.21 HISTORY OF ANTINEOPLASTIC CHEMOTHERAPY: Primary | ICD-10-CM

## 2025-04-25 DIAGNOSIS — Z09 ONCOLOGY FOLLOW-UP ENCOUNTER: ICD-10-CM

## 2025-04-25 DIAGNOSIS — Z85.3 HISTORY OF RIGHT BREAST CANCER: ICD-10-CM

## 2025-04-25 DIAGNOSIS — Z12.31 ENCOUNTER FOR SCREENING MAMMOGRAM FOR MALIGNANT NEOPLASM OF BREAST: ICD-10-CM

## 2025-04-25 DIAGNOSIS — Z92.3 HISTORY OF EXTERNAL BEAM RADIATION THERAPY: ICD-10-CM

## 2025-04-25 PROCEDURE — 77063 BREAST TOMOSYNTHESIS BI: CPT

## 2025-04-25 PROCEDURE — 1036F TOBACCO NON-USER: CPT | Performed by: NURSE PRACTITIONER

## 2025-04-25 PROCEDURE — 99214 OFFICE O/P EST MOD 30 MIN: CPT | Performed by: NURSE PRACTITIONER

## 2025-04-25 ASSESSMENT — PAIN SCALES - GENERAL: PAINLEVEL_OUTOF10: 0-NO PAIN

## 2025-04-25 NOTE — PATIENT INSTRUCTIONS
Please call us at [phone] option 5 then option 2 with any questions or concerns.    1. Exercise 2.5 hours per week; bone strengthening, cardio-vascular, resistance training.  2. Please do self breast exams monthly.  3. Keep alcohol under 3 drinks per week.  4. Sun safety - limit sun exposure from 11a-2p when its at its hottest, apply 15-30 sun block and re-apply every 1-2 hours if perspiring or swimming.  5. Eat a plant based diet, add in oily fishes such as mackerel, tuna, and salmon.  6. Get in at least 1,000 mg of calcium per day through diet or supplement for bone strength. Examples of foods higher in calcium are milk, yogurt, fruited yogurt, oranges, fortified orange juice, almonds, almond milk, broccoli, spinach, bok selvin, mustard greens, puddings, custards, ice cream, fortified cereals, bars, and crackers.   7. Exam today was negative  8. Please call the office if any new mass or rash in or around breast, or any uncontrolled symptoms that last over 2-3 weeks at [phone].  9. Team will reach out to you for all abnormal results. Mammogram results can take up to 7 days to result in [facility]. Please call the office if you had additional testing done and need to review the results at [phone].   10. It was nice seeing you today, Nydia . I will see you back in 1 year with your mammogram. Thank you for choosing [facility] with your care.

## 2025-04-25 NOTE — PROGRESS NOTES
"Oncology Follow-Up    Nydia Braun  [id number]                Breast         AJCC Edition: 7th (AJCC), Diagnosis Date: 12-Sep-2016, IIA, T2 N0 M0       Treatment History:    Pt reports feeling a \"bump\" in her right breast about 6 months prior to breast imaging.  Was considering weight loss surgery and given breast symptoms, imaging was  ordered     8/29/16- Diagnostic b/l mammogram with gertrudis and b/l breast ultrasound- On mammogram, right breast circumscribed mass in inferior medial breast and mass in superior right breast (may represent intramammary LN). Asymmetry in left breast.  U/s right breast-  4:00, 9cm FN, 3.6 X 3.3 X 3.5 cm irregular hypoechoic mass; in superior breast, at 9:00, 8 cm FN, 2 intramammary LNs, larger one measuring 0.6 cm.  U/s left breast 0.8 cm complicated cyst at 12:00, 7 cm FN.  Tissue markers placed at time of biopsy     9/12/16- U/s guided core biopsy of right breast mass, 4:00, 9cm FN- IDC, grade 3 with extensive necrosis, ER-neg, DC-neg, HER2 2+ by IHC (dual NACHO neg with a HER2/CEP17 ratio of 1.2).  Right breast intramammary LN biopsy- negative for cancer.     9/29/16 - Mediport placed     12/14/16 - negative genetic testing     10/5/16 - 2/15/17 neoadjuvant ddAC every 2 weeks X 4---> weekly taxol X 12 completed     2/27/17- Right lumpectomy with SN biopsy showed no residual cancer; 0/2 LNs involved, no LVI.  Path staging: pCR, ypT0N0, RCB 0     Post-lumpectomy right breast radiation completed 5/2017         Subjective    Nydia presents for her Oncology Follow Up Visit. She feels well and reports no new health issues. 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 She rates her energy level as 8-9/10 and has no distress.  Nydia denies any unusual headaches, balance issues, depression, cough, shortness of breath, problems swallowing, changes in " chest/breast area, abdominal pain, bone or muscle pain, vaginal bleeding, rectal bleeding, blood in the urine, vaginal dryness, swelling arms or legs, new or unusual skin moles or lesions.         Objective      Vitals:    04/25/25 0829   BP: 120/77   Pulse: 55   Resp: 16   Temp: 36.7 °C (98 °F)   SpO2: 99%        Constitutional: Well developed, alert/oriented x3, no distress, cooperative   Eyes: clear sclera   ENMT: mucous membranes moist, no apparent lesions   Head/Neck: Neck supple, no bruits   Respiratory/Thorax: Patent airways, normal breath sounds with good chest expansion   Cardiovascular: Regular rate and rhythm, no murmurs, 2+ equal pulses of the extremities,   Gastrointestinal: Nondistended, soft, non-tender, no masses palpable, no organomegaly   Musculoskeletal: ROM intact, no joint swelling, normal strength   Extremities: normal extremities, no edema, cyanosis, contusions or wounds   Neurological: alert and oriented x3,  normal strength   Breast:     Lymphatic: No significant lymphadenopathy   Psychological: Appropriate mood and behavior   Skin: Warm and dry, no lesions, no rashes      Physical Exam  Chest:          Comments: Right breast positive for breast conserving surgery with well healed lower/inner and right axillar incisions; no masses, nodules, skin changes, discharge.  Left breast without masses, nodules, skin changes, discharge.          Lab Results   Component Value Date    WBC 4.8 03/14/2025    HGB 12.2 03/14/2025    HCT 36.9 03/14/2025    MCV 92.3 03/14/2025     03/14/2025       Chemistry    Lab Results   Component Value Date/Time     03/14/2025 0953    K 4.8 03/14/2025 0953     03/14/2025 0953    CO2 28 03/14/2025 0953    BUN 8 03/14/2025 0953    CREATININE 0.54 03/14/2025 0953    Lab Results   Component Value Date/Time    CALCIUM 9.3 03/14/2025 0953    ALKPHOS 134 03/14/2025 0953    AST 12 03/14/2025 0953    ALT 9 03/14/2025 0953    BILITOT 0.4 03/14/2025 0953          Imaging:  Mammogram results pending.        Assessment/Plan    Nydia is a 51 yo woman with a history of T2N0 right IDC G-3 TNBC, AC-T, XRT, and is currently on observation. There is no evidence of recurrent disease on today's exam.     Plan:  Exam is negative.  Discussed her daughter who should start breast imaging at age 32 (Nydia diagnosed at age 41) with MRI or mammogram. Discussed our High Risk program. She will call if interested in an appointment.  Nydia is up to date on her colonoscopy and vaccines. She will schedule a GYN appointment.  Encouraged monthly breast self exams, plant based diet, keep alcohol <3 drinks/week, exercise at least 2.5 hours/week.  We reviewed signs/symptoms of recurrence including new masses, new pigmented lesion, tugging or pulling of the skin, nipple discharge, rash in or around the chest area, or any new finding that doesn't resolve within a 2-3 weeks.  All of Nydia's questions/concerns were addressed.  Over 25 minutes of time was spent with this patient with >50% of the time with education, counseling, and coordination of care.   I will see Nydia back in one year with her mammogram. If all is normal, we will transition her oncology follow up to   Dr. Rush. Nydia is in agreement with this plan.      Diagnoses and all orders for this visit:  History of antineoplastic chemotherapy  History of right breast cancer  -     Clinic Appointment Request Follow Up; [facility]  -     Clinic Appointment Request Follow Up; Future  -     BI mammo bilateral screening tomosynthesis; Future  Oncology follow-up encounter  History of external beam radiation therapy  Encounter for screening mammogram for malignant neoplasm of breast  -     BI mammo bilateral screening tomosynthesis; Future      Pura Paula, APRN-CNP

## 2025-06-07 ENCOUNTER — OFFICE VISIT (OUTPATIENT)
Dept: URGENT CARE | Age: 50
End: 2025-06-07
Payer: COMMERCIAL

## 2025-06-07 VITALS
DIASTOLIC BLOOD PRESSURE: 70 MMHG | OXYGEN SATURATION: 97 % | SYSTOLIC BLOOD PRESSURE: 115 MMHG | BODY MASS INDEX: 29.99 KG/M2 | HEIGHT: 65 IN | HEART RATE: 60 BPM | TEMPERATURE: 98 F | RESPIRATION RATE: 20 BRPM | WEIGHT: 180 LBS

## 2025-06-07 DIAGNOSIS — Z98.818 STATUS POST WISDOM TOOTH EXTRACTION: Primary | ICD-10-CM

## 2025-06-07 DIAGNOSIS — R22.0 LEFT FACIAL SWELLING: ICD-10-CM

## 2025-06-07 RX ORDER — AMOXICILLIN 500 MG/1
CAPSULE ORAL
COMMUNITY
Start: 2025-06-04

## 2025-06-07 RX ORDER — METHYLPREDNISOLONE 4 MG/1
TABLET ORAL
COMMUNITY
Start: 2025-06-06

## 2025-06-07 ASSESSMENT — PAIN SCALES - GENERAL: PAINLEVEL_OUTOF10: 1

## 2025-06-07 NOTE — PROGRESS NOTES
Subjective   Patient ID: Nydia Braun is a 50 y.o. female. They present today with a chief complaint of Other (Swelling in face and neck after wisdom teeth removal. My dentist recommend recommended I go to the urgent care. - Entered by patient).    History of Present Illness  Patient is a very pleasant 50-year-old white female, no significant past medical history, presented to clinic with chief complaint of facial pain and swelling.  Patient states she had her wisdom teeth removed on Wednesday, 3 days ago.  She is presenting out of concern for pain and swelling associated with her left jaw that seems to be radiating down into her neck.  She presents to the urgent care today at the request of her dentist to ensure there are no signs of acute or impending airway compromise.  Patient denies any dysphagia odynophagia drooling or change in voice.  She does report some trismus and pain with opening her mouth however states this has been persistent since right after the surgery and not worsening in any way.  She is tolerating oral intake without difficulty.  She is on an oral antibiotic as well as a Medrol Dosepak that she initiated today.  She denies any fever or chills.  No chest pain or shortness of breath.  No further complaints at this time.          Past Medical History  Allergies as of 06/07/2025    (No Known Allergies)       Prescriptions Prior to Admission[1]       Medical History[2]    Surgical History[3]     reports that she has never smoked. She has never been exposed to tobacco smoke. She has never used smokeless tobacco. She reports current alcohol use. She reports that she does not use drugs.    Review of Systems  Review of Systems                               Objective    There were no vitals filed for this visit.  Patient's last menstrual period was 07/24/2023.    Physical Exam  Gen.: Vitals noted no distress afebrile. Normal phonation, no stridor, no trismus.     ENT: TMs clear bilaterally, EACs  unremarkable. Mastoids nontender. Posterior oropharynx without erythema, exudate, or swelling. Uvula is in the midline and nonedematous. No Lei's Angina. No trismus, drooling, muffled voice. Maintaining secretions well.  There is some left sided maxillary facial swelling noted on examination with no overlying erythema induration or warmth.  There is some reactive left-sided submandibular and left anterior cervical lymphadenopathy.  No overlying skin changes.  Trachea is midline.  There is no drooling or change in voice noted on examination handling oral secretions well.  She does have some trismus.    Neck: Supple. No meningismus through full range of motion. No lymphadenopathy.     Cardiac: Regular rate rhythm no murmur.     Lungs: Good aeration throughout. No adventitious breath sounds.     Abdomen: Soft nontender nonsurgical throughout. Normoactive bowel sounds.     Extremities: No peripheral edema, negative Homans bilaterally no cords.     Skin: No rash.     Neuro: No focal neurologic deficits.   Procedures    Point of Care Test & Imaging Results from this visit    Imaging  No results found.    Cardiology, Vascular, and Other Imaging  No other imaging results found for the past 2 days      Diagnostic study results (if any) were reviewed by [facility].    Assessment/Plan   Allergies, medications, history, and pertinent labs/EKGs/Imaging reviewed by David Mcleod PA-C.     Medical Decision Making  Patient was seen eval in the clinic for chief complaint of oral pain and facial swelling.  On exam patient is nontoxic very well-appearing respite comfortably no acute distress.  Vital signs are stable, afebrile.  Chest is clear, heart is regular, belly is diffusely soft and nontender.  Valuation of face ears nose and throat as above.  I do not feel there is an impending airway compromise at this time.  I feel swelling is all inflammatory changes within normal limits status post wisdom teeth  extraction.  Advised the patient to employ cool compress to the area and continue her oral antibiotics and steroids as planned.  Advised to follow-up with her dentist as planned on Wednesday.  She discharged home at this time.  I did review very strict report to ED precautions with the patient.  I reviewed my impression, plan, strict report ED precautions and she expresses understanding and agreement plan of care.    Orders and Diagnoses  There are no diagnoses linked to this encounter.      Medical Admin Record      Follow Up Instructions  No follow-ups on file.    Patient disposition: Home    Electronically signed by [facility]  5:34 PM           [1] (Not in a hospital admission)  [2]   Past Medical History:  Diagnosis Date    Breast cancer     Breast cancer 2024    Carcinoma of lower-inner quadrant of right breast 2024    Gestational (pregnancy-induced) hypertension without significant proteinuria, unspecified trimester ([facility])     Pregnancy-induced hypertension, unspecified trimester    Hx antineoplastic chemo     Neoplasm of uncertain behavior of skin 2023    Neoplasm of unspecified behavior of bone, soft tissue, and skin 2023    Other conditions influencing health status     Onset of menses    Personal history of irradiation     Personal history of other complications of pregnancy, childbirth and the puerperium     History of miscarriage    Personal history of other diseases of the digestive system     History of hemorrhoids    Personal history of other infectious and parasitic diseases     History of HPV infection   [3]   Past Surgical History:  Procedure Laterality Date    BREAST BIOPSY  2016    BREAST LUMPECTOMY Right 2016    Lumpectomy     SECTION, CLASSIC  2016     Section    OTHER SURGICAL HISTORY  2016    Surgical Treatment Of Missed     OTHER SURGICAL HISTORY  2016    Tubal Ligation During  Section    TUBAL  LIGATION  06/07/2016    Tubal Ligation

## 2025-06-30 ENCOUNTER — OFFICE VISIT (OUTPATIENT)
Dept: GASTROENTEROLOGY | Facility: CLINIC | Age: 50
End: 2025-06-30
Payer: COMMERCIAL

## 2025-06-30 VITALS
HEART RATE: 52 BPM | WEIGHT: 179 LBS | BODY MASS INDEX: 29.82 KG/M2 | HEIGHT: 65 IN | SYSTOLIC BLOOD PRESSURE: 113 MMHG | TEMPERATURE: 96.5 F | DIASTOLIC BLOOD PRESSURE: 72 MMHG

## 2025-06-30 DIAGNOSIS — R10.13 DYSPEPSIA: ICD-10-CM

## 2025-06-30 DIAGNOSIS — K22.70 BARRETT'S ESOPHAGUS WITHOUT DYSPLASIA: ICD-10-CM

## 2025-06-30 DIAGNOSIS — R11.2 NAUSEA AND VOMITING, UNSPECIFIED VOMITING TYPE: Primary | ICD-10-CM

## 2025-06-30 PROCEDURE — 3008F BODY MASS INDEX DOCD: CPT | Performed by: NURSE PRACTITIONER

## 2025-06-30 PROCEDURE — 99204 OFFICE O/P NEW MOD 45 MIN: CPT | Performed by: NURSE PRACTITIONER

## 2025-06-30 PROCEDURE — 99212 OFFICE O/P EST SF 10 MIN: CPT | Performed by: NURSE PRACTITIONER

## 2025-06-30 RX ORDER — OMEPRAZOLE 20 MG/1
20 TABLET, DELAYED RELEASE ORAL
COMMUNITY

## 2025-06-30 ASSESSMENT — ENCOUNTER SYMPTOMS
NUMBNESS: 0
EYE PAIN: 0
LIGHT-HEADEDNESS: 0
NERVOUS/ANXIOUS: 0
HEADACHES: 0
FREQUENCY: 0
HEMATURIA: 0
FEVER: 0
FATIGUE: 0
DYSURIA: 0
SHORTNESS OF BREATH: 0
MYALGIAS: 0
PHOTOPHOBIA: 0
WHEEZING: 0
ARTHRALGIAS: 0
DIAPHORESIS: 0
DIZZINESS: 0
PALPITATIONS: 0
HALLUCINATIONS: 0
FLANK PAIN: 0
WEAKNESS: 0
JOINT SWELLING: 0
BACK PAIN: 0
CHILLS: 0
ADENOPATHY: 0
SORE THROAT: 0
COUGH: 0
AGITATION: 0

## 2025-06-30 NOTE — PATIENT INSTRUCTIONS
Thanks for coming to the GI clinic.    Please call [phone] to schedule an EGD.    Continue omeprazole 20 mg once daily (30 to 60 minutes prior to breakfast).    Marijuana can cause issues with nausea or vomiting, although I suspect this is not likely the cause because given how infrequent you use it.    You will be due for colonoscopy in 2032.    Follow-up will be based upon the above.   written material/verbal instruction

## 2025-06-30 NOTE — PROGRESS NOTES
"Subjective   Patient ID: Nydia Braun is a 50 y.o. female who presents for nausea and vomiting.    This is a 50-year-old WF with history of marijuana use, morbid obesity s/p RYGB and hiatal hernia repair (2021 ), right breast cancer s/p right lumpectomy (2017 ) with neoadjuvant chemotherapy and adjuvant radiation therapy, nondysplastic Rhodes's esophagus, GERD, diverticulitis, HLD, and depression who is presenting to the GI clinic for an initial visit.     History per patient and review of EMR    Reports for the past 6 months she's been having issues with nausea/vomiting. The vomiting is worse with intake of animal proteins including chicken and beef. Emesis looks like a \"mushy ball\".     Reports epigastric abdominal pain which she describes as a squeezing sensation which occurs when she vomits.    Reports postprandial abdominal fullness.     ROS positive for issues with heartburn prior to her gastric bypass surgery, but none since then.    Denies unintentional weight loss, regurgitation, dysphagia, odynophagia,  diarrhea, constipation, hematemesis, hematochezia, and melena,.     Reports losing 90 lbs since gastric bypass.     Reports several episodes of diverticulitis prior to gastric bypass surgery, but none since then.    EGD 11/2022 Dr. Segovia  :   - Gastric bypass with a normal-sized pouch and intact staple line. Gastrojejunal anastomosis characterized by inflammation, ulceration and mild stenosis. Biopsied for H. pylori.   - New Franken-colored mucosa suspicious for short-segment Rhodes's esophagus and classified as Rhodes's stage C0-M2 per Hamilton criteria. Biopsied.   - Normal examined jejunum.     FINAL DIAGNOSIS   A.  GASTRIC, COLD BIOPSY:    --GASTRIC MUCOSA WITH MILD CHRONIC NONSPECIFIC INFLAMMATION.   --NO HELICOBACTER PYLORI-LIKE ORGANISMS SEEN IN ROUTINE STAINED SECTIONS.     B.  LOWER ESOPHAGUS, COLD BIOPSY:    --COLUMNAR MUCOSA WITH FOCAL INTESTINAL METAPLASIA CONSISTENT WITH RHODES'S " "  ESOPHAGUS IN AN APPROPRIATE CLINICAL SETTING.   --NEGATIVE FOR DYSPLASIA       Screening colonoscopy 2022 Dr. Segovia [facility]: Sigmoid and descending colon diverticulosis; repeat in 10 years       Past medical history:   See above    Past surgical history:   See above    x 3   D&C   Tubal ligation     Family history:   No GI cancers, IBD, or celiac disease   Mother- diverticulitis with colonic resection     Social history:  Has \"a couple\" alcoholic beverages per week; denies heavy alcohol consumption   Uses  marijuana gummies 4 times a year   Denies use of tobacco     RX Allergies[1]     Current Medications[2]     Review of Systems   Constitutional:  Negative for chills, diaphoresis, fatigue and fever.   HENT:  Negative for congestion, ear pain, hearing loss, sneezing and sore throat.    Eyes:  Negative for photophobia, pain and visual disturbance.   Respiratory:  Negative for cough, shortness of breath and wheezing.    Cardiovascular:  Negative for chest pain, palpitations and leg swelling.   Endocrine: Negative for cold intolerance and heat intolerance.   Genitourinary:  Negative for dysuria, flank pain, frequency and hematuria.   Musculoskeletal:  Negative for arthralgias, back pain, gait problem, joint swelling and myalgias.   Skin:  Negative for rash.   Neurological:  Negative for dizziness, syncope, weakness, light-headedness, numbness and headaches.   Hematological:  Negative for adenopathy.   Psychiatric/Behavioral:  Negative for agitation and hallucinations. The patient is not nervous/anxious.      Objective     Lab Results   Component Value Date    WBC 4.8 2025    HGB 12.2 2025    HCT 36.9 2025    MCV 92.3 2025     2025       Lab Results   Component Value Date    CREATININE 0.54 2025    BUN 8 2025     2025    K 4.8 2025     2025    CO2 28 2025      Lab Results   Component Value Date    ALT 9 2025    AST 12 " "03/14/2025    ALKPHOS 134 03/14/2025    BILITOT 0.4 03/14/2025     Lab Results   Component Value Date    TSH 2.30 06/10/2022        /72   Pulse 52   Temp 35.8 °C (96.5 °F)   Ht 1.651 m (5' 5\")   Wt 81.2 kg (179 lb)   LMP 07/24/2023   BMI 29.79 kg/m²     Physical Exam  Constitutional:       General: She is not in acute distress.     Appearance: Normal appearance.   HENT:      Head: Normocephalic and atraumatic.   Eyes:      Conjunctiva/sclera: Conjunctivae normal.   Cardiovascular:      Rate and Rhythm: Normal rate and regular rhythm.      Heart sounds: No murmur heard.     No gallop.   Pulmonary:      Effort: Pulmonary effort is normal.      Breath sounds: Normal breath sounds.   Abdominal:      General: Bowel sounds are normal. There is no distension.      Tenderness: There is no abdominal tenderness. There is no guarding.   Musculoskeletal:         General: No swelling or deformity. Normal range of motion.      Cervical back: Normal range of motion. No rigidity.   Skin:     General: Skin is warm and dry.      Coloration: Skin is not jaundiced.      Findings: No lesion or rash.   Neurological:      General: No focal deficit present.      Mental Status: She is alert and oriented to person, place, and time.   Psychiatric:         Mood and Affect: Mood normal.         Assessment/Plan   Problem List Items Addressed This Visit    None  Visit Diagnoses         Nausea and vomiting, unspecified vomiting type    -  Primary    Relevant Orders    Esophagogastroduodenoscopy (EGD)      Tam's esophagus without dysplasia        Relevant Orders    Esophagogastroduodenoscopy (EGD)      Dyspepsia               1.  Chronic nausea/vomiting, dyspepsia: Etiology not entirely clear.  Consider anastomotic ulcer and reflux esophagitis.  I also question if marijuana use could be contributing, but given the infrequency of use I doubt this is cannabinoid hyperemesis syndrome.  - Will proceed with a EGD    2.  Nondysplastic " Tam's esophagus:  - EGD as above for surveillance  - Continue omeprazole 20 mg once daily (30-60 minutes prior to breakfast)    3.  Colorectal cancer screening:  - Screening colonoscopy due in 2032    4.  Follow-up:  - Will be based upon the above         [1] No Known Allergies  [2]   Current Outpatient Medications   Medication Sig Dispense Refill    CALCIUM ORAL Take by mouth.      citalopram (CeleXA) 20 mg tablet Take 1 tablet (20 mg) by mouth once daily. 90 tablet 1    cyanocobalamin, vitamin B-12, (VITAMIN B-12 ORAL) Take by mouth.      omeprazole OTC (PriLOSEC OTC) 20 mg EC tablet Take 1 tablet (20 mg) by mouth once daily in the morning. Take before meals. Do not crush, chew, or split.      pediatric multivitamin (Children's Multivitamin) tablet,chewable chewable tablet Chew every 12 hours.       No current facility-administered medications for this visit.

## 2025-07-02 ENCOUNTER — ANESTHESIA EVENT (OUTPATIENT)
Dept: GASTROENTEROLOGY | Facility: HOSPITAL | Age: 50
End: 2025-07-02
Payer: COMMERCIAL

## 2025-07-02 NOTE — ANESTHESIA PREPROCEDURE EVALUATION
"Patient: Nydia Braun    Procedure Information       Date/Time: 07/03/25 1010    Scheduled providers: Jose Gardiner MD; Era Bates MD    Procedure: EGD    Location: [facility]                                                           Pre-Anesthesia Evaluation      Nydia Braun is a 50 y.o. female who presents for the above mentioned procedure due to Nausea and vomiting, unspecified vomiting type [R11.2];Tam's esophagus without dysplasia [K22.70]       Medical History[1]  Surgical History[2]  Social History[3]  RX Allergies[4]  Current Medications[5]  Prior to Admission medications    Medication Sig Start Date End Date Taking? Authorizing Provider   CALCIUM ORAL Take by mouth.   Yes Historical Provider, MD   citalopram (CeleXA) 20 mg tablet Take 1 tablet (20 mg) by mouth once daily. 1/20/25  Yes Luh Rush, APRN-CNP   cyanocobalamin, vitamin B-12, (VITAMIN B-12 ORAL) Take by mouth.   Yes Historical Provider, MD   omeprazole OTC (PriLOSEC OTC) 20 mg EC tablet Take 1 tablet (20 mg) by mouth once daily in the morning. Take before meals. Do not crush, chew, or split.   Yes Historical Provider, MD   pediatric multivitamin (Children's Multivitamin) tablet,chewable chewable tablet Chew every 12 hours. 12/20/21  Yes Historical Provider, MD   amoxicillin (Amoxil) 500 mg capsule TAKE ONE CAPSULE THREE TIMES PER DAY UNTIL GONE 6/4/25 6/30/25  Historical Provider, MD   methylPREDNISolone (Medrol Dospak) 4 mg tablets  6/6/25 6/30/25  Historical Provider, MD     No medication comments found.   Visit Vitals  /70   Pulse 56   Temp 36.2 °C (97.2 °F) (Temporal)   Resp 18   Ht 1.651 m (5' 5\")   Wt 80 kg (176 lb 5.9 oz)   LMP 07/24/2023   SpO2 99%   BMI 29.35 kg/m²   OB Status Postmenopausal   Smoking Status Never   BSA 1.92 m²     Lab Results   Component Value Date    WBC 4.8 03/14/2025    HGB 12.2 03/14/2025    HCT 36.9 03/14/2025     03/14/2025    INR 1.1 10/31/2022    ABO A " 05/20/2021     Lab Results   Component Value Date    TSH 2.30 06/10/2022    HGBA1C 5.6 03/14/2025    GLUCOSE 92 03/14/2025     03/14/2025    K 4.8 03/14/2025     03/14/2025    CREATININE 0.54 03/14/2025    BUN 8 03/14/2025    EGFR 112 03/14/2025    CO2 28 03/14/2025    AST 12 03/14/2025    ALT 9 03/14/2025            Relevant Problems   Neuro   (+) Depression      GI   (+) GERD (gastroesophageal reflux disease)      Circulatory   (+) Elevated coronary artery calcium score      Digestive   (+) History of bariatric surgery      Hematology and Neoplasia   (+) History of right breast cancer       Clinical information reviewed:   Tobacco  Allergies  Meds   Med Hx  Surg Hx  OB Status  Fam Hx  Soc   Hx        NPO Detail:  NPO/Void Status  Date of Last Liquid: 07/03/25  Time of Last Liquid: 0000  Date of Last Solid: 07/02/25  Time of Last Solid: 2030         Physical Exam    Airway  Mallampati: I  TM distance: >3 FB  Neck ROM: full  Mouth opening: 3 or more finger widths     Cardiovascular   Rhythm: regular  Rate: normal     Dental - normal exam     Pulmonary Comments: Normal RR  Non-labored respiration    Abdominal            Anesthesia Plan    History of general anesthesia?: yes  History of complications of general anesthesia?: no    ASA 3     MAC   (Standard ASA monitoring. Discussed possibility of transient awareness and recall with the patient.)  intravenous induction   Anesthetic plan and risks discussed with patient.    Plan discussed with CRNA and CAA.           [1]   Past Medical History:  Diagnosis Date    Breast cancer     Breast cancer 02/28/2024    Carcinoma of lower-inner quadrant of right breast 02/28/2024    Gestational (pregnancy-induced) hypertension without significant proteinuria, unspecified trimester ([facility])     Pregnancy-induced hypertension, unspecified trimester    Hx antineoplastic chemo 10/01/2016    Neoplasm of uncertain behavior of skin 07/18/2023    Neoplasm of unspecified  behavior of bone, soft tissue, and skin 2023    Other conditions influencing health status     Onset of menses    Personal history of irradiation 2017    Personal history of other complications of pregnancy, childbirth and the puerperium     History of miscarriage    Personal history of other diseases of the digestive system     History of hemorrhoids    Personal history of other infectious and parasitic diseases     History of HPV infection   [2]   Past Surgical History:  Procedure Laterality Date    BREAST BIOPSY  2016    BREAST LUMPECTOMY Right 2016    Lumpectomy     SECTION, CLASSIC  2016     Section    OTHER SURGICAL HISTORY  2016    Surgical Treatment Of Missed     OTHER SURGICAL HISTORY  2016    Tubal Ligation During  Section    TUBAL LIGATION  2016    Tubal Ligation   [3]   Social History  Tobacco Use    Smoking status: Never     Passive exposure: Never    Smokeless tobacco: Never   Substance Use Topics    Alcohol use: Yes    Drug use: Yes     Types: Marijuana   [4] No Known Allergies  [5]   Current Outpatient Medications:     CALCIUM ORAL, Take by mouth., Disp: , Rfl:     citalopram (CeleXA) 20 mg tablet, Take 1 tablet (20 mg) by mouth once daily., Disp: 90 tablet, Rfl: 1    cyanocobalamin, vitamin B-12, (VITAMIN B-12 ORAL), Take by mouth., Disp: , Rfl:     omeprazole OTC (PriLOSEC OTC) 20 mg EC tablet, Take 1 tablet (20 mg) by mouth once daily in the morning. Take before meals. Do not crush, chew, or split., Disp: , Rfl:     pediatric multivitamin (Children's Multivitamin) tablet,chewable chewable tablet, Chew every 12 hours., Disp: , Rfl:

## 2025-07-03 ENCOUNTER — HOSPITAL ENCOUNTER (OUTPATIENT)
Dept: GASTROENTEROLOGY | Facility: HOSPITAL | Age: 50
Discharge: HOME | End: 2025-07-03
Payer: COMMERCIAL

## 2025-07-03 ENCOUNTER — ANESTHESIA (OUTPATIENT)
Dept: GASTROENTEROLOGY | Facility: HOSPITAL | Age: 50
End: 2025-07-03
Payer: COMMERCIAL

## 2025-07-03 VITALS
HEART RATE: 64 BPM | DIASTOLIC BLOOD PRESSURE: 71 MMHG | OXYGEN SATURATION: 100 % | WEIGHT: 176.37 LBS | TEMPERATURE: 96.8 F | SYSTOLIC BLOOD PRESSURE: 112 MMHG | HEIGHT: 65 IN | BODY MASS INDEX: 29.38 KG/M2 | RESPIRATION RATE: 16 BRPM

## 2025-07-03 DIAGNOSIS — R11.2 NAUSEA AND VOMITING, UNSPECIFIED VOMITING TYPE: ICD-10-CM

## 2025-07-03 DIAGNOSIS — K22.70 BARRETT'S ESOPHAGUS WITHOUT DYSPLASIA: ICD-10-CM

## 2025-07-03 PROCEDURE — 43239 EGD BIOPSY SINGLE/MULTIPLE: CPT | Performed by: INTERNAL MEDICINE

## 2025-07-03 PROCEDURE — A43239 PR EDG TRANSORAL BIOPSY SINGLE/MULTIPLE: Performed by: ANESTHESIOLOGY

## 2025-07-03 PROCEDURE — 7100000010 HC PHASE TWO TIME - EACH INCREMENTAL 1 MINUTE

## 2025-07-03 PROCEDURE — A43239 PR EDG TRANSORAL BIOPSY SINGLE/MULTIPLE: Performed by: NURSE ANESTHETIST, CERTIFIED REGISTERED

## 2025-07-03 PROCEDURE — 2500000004 HC RX 250 GENERAL PHARMACY W/ HCPCS (ALT 636 FOR OP/ED): Performed by: NURSE ANESTHETIST, CERTIFIED REGISTERED

## 2025-07-03 PROCEDURE — 3700000001 HC GENERAL ANESTHESIA TIME - INITIAL BASE CHARGE

## 2025-07-03 PROCEDURE — 3700000002 HC GENERAL ANESTHESIA TIME - EACH INCREMENTAL 1 MINUTE

## 2025-07-03 PROCEDURE — 7100000009 HC PHASE TWO TIME - INITIAL BASE CHARGE

## 2025-07-03 RX ORDER — GLYCOPYRROLATE 0.2 MG/ML
INJECTION INTRAMUSCULAR; INTRAVENOUS AS NEEDED
Status: DISCONTINUED | OUTPATIENT
Start: 2025-07-03 | End: 2025-07-03

## 2025-07-03 RX ORDER — MIDAZOLAM HYDROCHLORIDE 1 MG/ML
INJECTION INTRAMUSCULAR; INTRAVENOUS AS NEEDED
Status: DISCONTINUED | OUTPATIENT
Start: 2025-07-03 | End: 2025-07-03

## 2025-07-03 RX ORDER — PROPOFOL 10 MG/ML
INJECTION, EMULSION INTRAVENOUS AS NEEDED
Status: DISCONTINUED | OUTPATIENT
Start: 2025-07-03 | End: 2025-07-03

## 2025-07-03 RX ORDER — LIDOCAINE HYDROCHLORIDE 20 MG/ML
INJECTION, SOLUTION EPIDURAL; INFILTRATION; INTRACAUDAL; PERINEURAL AS NEEDED
Status: DISCONTINUED | OUTPATIENT
Start: 2025-07-03 | End: 2025-07-03

## 2025-07-03 RX ADMIN — GLYCOPYRROLATE 0.1 MG: 0.2 INJECTION INTRAMUSCULAR; INTRAVENOUS at 11:12

## 2025-07-03 RX ADMIN — PROPOFOL 20 MG: 10 INJECTION, EMULSION INTRAVENOUS at 11:15

## 2025-07-03 RX ADMIN — SODIUM CHLORIDE, SODIUM LACTATE, POTASSIUM CHLORIDE, AND CALCIUM CHLORIDE: .6; .31; .03; .02 INJECTION, SOLUTION INTRAVENOUS at 11:10

## 2025-07-03 RX ADMIN — MIDAZOLAM HYDROCHLORIDE 2 MG: 1 INJECTION, SOLUTION INTRAMUSCULAR; INTRAVENOUS at 11:10

## 2025-07-03 RX ADMIN — PROPOFOL 50 MG: 10 INJECTION, EMULSION INTRAVENOUS at 11:12

## 2025-07-03 RX ADMIN — PROPOFOL 30 MG: 10 INJECTION, EMULSION INTRAVENOUS at 11:14

## 2025-07-03 RX ADMIN — LIDOCAINE HYDROCHLORIDE 100 MG: 20 INJECTION, SOLUTION EPIDURAL; INFILTRATION; INTRACAUDAL; PERINEURAL at 11:12

## 2025-07-03 RX ADMIN — PROPOFOL 150 MCG/KG/MIN: 10 INJECTION, EMULSION INTRAVENOUS at 11:13

## 2025-07-03 ASSESSMENT — COLUMBIA-SUICIDE SEVERITY RATING SCALE - C-SSRS
1. IN THE PAST MONTH, HAVE YOU WISHED YOU WERE DEAD OR WISHED YOU COULD GO TO SLEEP AND NOT WAKE UP?: NO
2. HAVE YOU ACTUALLY HAD ANY THOUGHTS OF KILLING YOURSELF?: NO
6. HAVE YOU EVER DONE ANYTHING, STARTED TO DO ANYTHING, OR PREPARED TO DO ANYTHING TO END YOUR LIFE?: NO

## 2025-07-03 ASSESSMENT — PAIN SCALES - GENERAL
PAINLEVEL_OUTOF10: 0 - NO PAIN
PAINLEVEL_OUTOF10: 0 - NO PAIN

## 2025-07-03 ASSESSMENT — PAIN - FUNCTIONAL ASSESSMENT
PAIN_FUNCTIONAL_ASSESSMENT: UNABLE TO SELF-REPORT
PAIN_FUNCTIONAL_ASSESSMENT: UNABLE TO SELF-REPORT
PAIN_FUNCTIONAL_ASSESSMENT: 0-10
PAIN_FUNCTIONAL_ASSESSMENT: 0-10

## 2025-07-03 NOTE — ANESTHESIA POSTPROCEDURE EVALUATION
Patient: Nydia Braun    Procedure Summary       Date: 07/03/25 Room / Location: [facility]    Anesthesia Start: 1110 Anesthesia Stop: 1127    Procedure: EGD Diagnosis:       Nausea and vomiting, unspecified vomiting type      Tam's esophagus without dysplasia    Scheduled Providers: Jose Gardiner MD; Era Bates MD; Sophia Mcclain RN; Charly Oglesby MA; BAILEY Solano-CRNA Responsible Provider: Era Bates MD    Anesthesia Type: MAC ASA Status: 3            Anesthesia Type: MAC    Vitals Value Taken Time   /71 07/03/25 11:54   Temp 36 °C (96.8 °F) 07/03/25 11:54   Pulse 64 07/03/25 11:54   Resp 16 07/03/25 11:54   SpO2 100 % 07/03/25 11:54       Anesthesia Post Evaluation    Patient location during evaluation: PACU  Patient participation: complete - patient participated  Level of consciousness: awake  Pain management: adequate  Multimodal analgesia pain management approach  Airway patency: patent  Cardiovascular status: hemodynamically stable  Respiratory status: spontaneous ventilation  Hydration status: euvolemic  Postoperative Nausea and Vomiting: none        No notable events documented.

## 2025-07-03 NOTE — DISCHARGE INSTRUCTIONS

## 2025-07-03 NOTE — H&P
"History Of Present Illness  Nydia Braun is a 50 y.o. female with h/o Jerod en Y gastric bypass who has been experiencing chronic nausea and vomiting for the past year. She had an EGD in 2022 which showed (among other findings) non dysplastic rod's esophagus. She is presenting for her EGD.      Past Medical History  Medical History[1]  Surgical History  Surgical History[2]  Social History  She reports that she has never smoked. She has never been exposed to tobacco smoke. She has never used smokeless tobacco. She reports current alcohol use. She reports current drug use. Drug: Marijuana.    Family History  Family History[3]     Allergies  Allergies[4]  Review of Systems   All other systems reviewed and are negative.       Physical Exam  HENT:      Mouth/Throat:      Mouth: Mucous membranes are moist.      Pharynx: No oropharyngeal exudate.   Cardiovascular:      Rate and Rhythm: Normal rate and regular rhythm.      Pulses: Normal pulses.   Pulmonary:      Effort: Pulmonary effort is normal.   Abdominal:      General: Abdomen is flat. There is no distension.      Palpations: Abdomen is soft.      Tenderness: There is no abdominal tenderness.   Skin:     General: Skin is warm.      Capillary Refill: Capillary refill takes less than 2 seconds.   Neurological:      General: No focal deficit present.      Mental Status: She is alert.   Psychiatric:         Mood and Affect: Mood normal.          Last Recorded Vitals  Blood pressure 120/70, pulse 56, temperature 36.2 °C (97.2 °F), temperature source Temporal, resp. rate 18, height 1.651 m (5' 5\"), weight 80 kg (176 lb 5.9 oz), last menstrual period 07/24/2023, SpO2 99%.    Assessment/Plan   Will proceed with EGD     Steven Dodge MD         [1]   Past Medical History:  Diagnosis Date    Breast cancer     Breast cancer 02/28/2024    Carcinoma of lower-inner quadrant of right breast 02/28/2024    Gestational (pregnancy-induced) hypertension without significant " proteinuria, unspecified trimester ([facility])     Pregnancy-induced hypertension, unspecified trimester    Hx antineoplastic chemo 10/01/2016    Neoplasm of uncertain behavior of skin 2023    Neoplasm of unspecified behavior of bone, soft tissue, and skin 2023    Other conditions influencing health status     Onset of menses    Personal history of irradiation 2017    Personal history of other complications of pregnancy, childbirth and the puerperium     History of miscarriage    Personal history of other diseases of the digestive system     History of hemorrhoids    Personal history of other infectious and parasitic diseases     History of HPV infection   [2]   Past Surgical History:  Procedure Laterality Date    BREAST BIOPSY  2016    BREAST LUMPECTOMY Right 2016    Lumpectomy     SECTION, CLASSIC  2016     Section    OTHER SURGICAL HISTORY  2016    Surgical Treatment Of Missed     OTHER SURGICAL HISTORY  2016    Tubal Ligation During  Section    TUBAL LIGATION  2016    Tubal Ligation   [3]   Family History  Problem Relation Name Age of Onset    Breast cancer Mother's Sister  40 - 49        Again in her 60s    Endometrial cancer Mother  70 - 79   [4] No Known Allergies

## 2025-07-03 NOTE — NURSING NOTE
1155- Pt assisted with dressing with help of family. IV removed dressing applied. Discharge instructions provided to pt and family. Educated on medications, effects of anesthesia, and homecoming care. Pt and family verbalizing understanding of all instructions provided at this time. All questions and concerns answered. Pt given contact information for provider.     1203- Pt assisted to main lobby via  by transport. Dc in stable condition to home. All belongings taken with pt.

## 2025-07-09 LAB
LABORATORY COMMENT REPORT: NORMAL
PATH REPORT.FINAL DX SPEC: NORMAL
PATH REPORT.GROSS SPEC: NORMAL
PATH REPORT.TOTAL CANCER: NORMAL

## 2025-07-18 ENCOUNTER — TELEPHONE (OUTPATIENT)
Dept: SURGERY | Facility: CLINIC | Age: 50
End: 2025-07-18
Payer: COMMERCIAL

## 2025-07-18 NOTE — TELEPHONE ENCOUNTER
Received request to schedule patient for follow up visit. Attempted to contact patient, but was unable to reach at this time. Left  requesting return call to clinic with contact information provided.

## 2025-07-22 ENCOUNTER — TELEPHONE (OUTPATIENT)
Dept: SURGERY | Facility: HOSPITAL | Age: 50
End: 2025-07-22
Payer: COMMERCIAL

## 2025-07-25 NOTE — PROGRESS NOTES
BARIATRIC SURGERY CLINIC  FOLLOW UP NOTE      Name: Nydia Braun  MRN: [id number]      Virtual visit, patient was having trouble with her bike although video was on so we did the visit via telephone.    Index Surgery  Date of Surgery: 5/26/2021  Surgical Procedure: Laparoscopic alonzo en y gastric bypass 10887  Initial weight: 258 LB  Pre-surgical weight: 257 LB  Current weight 177 Lbs       Visit: 4 years  Today's Visit:   Wt Readings from Last 1 Encounters:   07/03/25 80 kg (176 lb 5.9 oz)    There is no height or weight on file to calculate BMI.   Last Visit:    Wt Readings from Last 3 Encounters:   07/03/25 80 kg (176 lb 5.9 oz)   06/30/25 81.2 kg (179 lb)   06/07/25 81.6 kg (180 lb)        HPI: Pt is here due to narrowing of the anastomosis from GBP. N&V for 1 year. EGD done.     EGD 7/3/25 Dr. Gardiner:  Impression  C0M2 Tam's esophagus; performed targeted cold forceps biopsies for dysplasia screening  Esophagus exam otherwise normal  The gastric pouch appeared normal.  Erythematous, ulcerated mucosa at the gastrojejunostomy anastomosis. Narrowing also noted at the anastomosis, dilated with passage of regular gastroscope  The jejunum appeared normal.        REVIEW OF SYSTEMS:  Patient for the last many months has been having difficulty with solid foods specially proteins and she throws up after eating, sometimes many hours afterwards.  It is mostly undigested food.    She is okay with liquids and soft diet.      PHYSICAL EXAM:        A/P:     History of gastric bypass few years ago, was doing well last many months has been having progressively worsening dysphagia and difficulty tolerating solid proteins.    Recent endoscopy revealed a stricture at the gastrojejunostomy and ulceration, she was put on omeprazole with some relief.    She takes some vitamins has not seen the dietitian lately.  Stated that she had labs done with her doctor and was told they were fine.    She is not a smoker and does not use  NSAIDs.  Not on steroids.      Recommendations:    Unknown etiology of marginal ulcer,    Advised to stop taking any caffeine.    Advised the patient to start PPI twice daily, open the capsule and sprinkle the granules.  Liquid Carafate/Carafate slurry 4 times every day prior to meals    Will get annual bariatric labs except CBC and CMP    Follow-up after 2 months, sooner as needed.

## 2025-07-29 ENCOUNTER — TELEMEDICINE (OUTPATIENT)
Dept: SURGERY | Facility: HOSPITAL | Age: 50
End: 2025-07-29
Payer: COMMERCIAL

## 2025-07-29 DIAGNOSIS — K28.9 MARGINAL ULCER: Primary | ICD-10-CM

## 2025-07-29 DIAGNOSIS — R10.13 EPIGASTRIC PAIN: ICD-10-CM

## 2025-07-29 DIAGNOSIS — Z98.84 HISTORY OF GASTRIC BYPASS: ICD-10-CM

## 2025-07-29 DIAGNOSIS — R11.2 NAUSEA AND VOMITING, UNSPECIFIED VOMITING TYPE: ICD-10-CM

## 2025-07-29 PROCEDURE — 99213 OFFICE O/P EST LOW 20 MIN: CPT | Performed by: SURGERY

## 2025-07-29 PROCEDURE — 99213 OFFICE O/P EST LOW 20 MIN: CPT | Mod: 95 | Performed by: SURGERY

## 2025-07-29 RX ORDER — OMEPRAZOLE 40 MG/1
40 CAPSULE, DELAYED RELEASE ORAL
Qty: 30 CAPSULE | Refills: 2 | Status: SHIPPED | OUTPATIENT
Start: 2025-07-29

## 2025-07-29 RX ORDER — SUCRALFATE 1 G/10ML
1 SUSPENSION ORAL 4 TIMES DAILY
Qty: 1200 ML | Refills: 0 | Status: SHIPPED | OUTPATIENT
Start: 2025-07-29 | End: 2025-11-26

## 2025-08-05 DIAGNOSIS — Z98.84 BARIATRIC SURGERY STATUS: ICD-10-CM

## 2025-08-05 DIAGNOSIS — Z13.21 ENCOUNTER FOR VITAMIN DEFICIENCY SCREENING: ICD-10-CM

## 2025-08-05 DIAGNOSIS — Z98.84 STATUS POST BARIATRIC SURGERY: ICD-10-CM
